# Patient Record
Sex: MALE | Race: WHITE | Employment: FULL TIME | URBAN - METROPOLITAN AREA
[De-identification: names, ages, dates, MRNs, and addresses within clinical notes are randomized per-mention and may not be internally consistent; named-entity substitution may affect disease eponyms.]

---

## 2017-01-06 ENCOUNTER — OFFICE VISIT (OUTPATIENT)
Dept: INTERNAL MEDICINE CLINIC | Facility: CLINIC | Age: 67
End: 2017-01-06

## 2017-01-06 VITALS
OXYGEN SATURATION: 98 % | HEIGHT: 72 IN | WEIGHT: 293.81 LBS | TEMPERATURE: 98 F | HEART RATE: 56 BPM | DIASTOLIC BLOOD PRESSURE: 80 MMHG | BODY MASS INDEX: 39.8 KG/M2 | SYSTOLIC BLOOD PRESSURE: 140 MMHG

## 2017-01-06 DIAGNOSIS — M15.9 PRIMARY OSTEOARTHRITIS INVOLVING MULTIPLE JOINTS: ICD-10-CM

## 2017-01-06 DIAGNOSIS — I67.1 CEREBRAL ANEURYSM: ICD-10-CM

## 2017-01-06 DIAGNOSIS — E78.00 HYPERCHOLESTEREMIA: ICD-10-CM

## 2017-01-06 DIAGNOSIS — K63.5 POLYP OF COLON, UNSPECIFIED PART OF COLON, UNSPECIFIED TYPE: ICD-10-CM

## 2017-01-06 DIAGNOSIS — I10 ESSENTIAL HYPERTENSION: Primary | ICD-10-CM

## 2017-01-06 PROBLEM — M15.0 PRIMARY OSTEOARTHRITIS INVOLVING MULTIPLE JOINTS: Status: ACTIVE | Noted: 2017-01-06

## 2017-01-06 PROCEDURE — 90670 PCV13 VACCINE IM: CPT | Performed by: INTERNAL MEDICINE

## 2017-01-06 PROCEDURE — 99214 OFFICE O/P EST MOD 30 MIN: CPT | Performed by: INTERNAL MEDICINE

## 2017-01-06 PROCEDURE — 90471 IMMUNIZATION ADMIN: CPT | Performed by: INTERNAL MEDICINE

## 2017-01-06 PROCEDURE — 99212 OFFICE O/P EST SF 10 MIN: CPT | Performed by: INTERNAL MEDICINE

## 2017-01-06 RX ORDER — MELOXICAM 15 MG/1
15 TABLET ORAL DAILY
Qty: 30 TABLET | Refills: 5 | Status: SHIPPED | OUTPATIENT
Start: 2017-01-06 | End: 2017-09-11

## 2017-01-06 NOTE — PATIENT INSTRUCTIONS
1.  Patient is to continue his current diet, medications and activity. 2.  Patient will be given his Prevnar vaccine today. He will need to have his Pneumovax next year.   3.  I will refer the patient to see Dr. Elisa Art, podiatrist, for evaluation of

## 2017-01-06 NOTE — PROGRESS NOTES
Annie Kay is a 73year old male. Patient presents with: Follow - Up: Patient reports he had a fall about the week after thanksgiving while putting up [employer] lights.  He denies hitting his head but reports his back and right hand continue to have Packs/Day: 0.00  Years:           Quit date: 06/14/2008    Smokeless Status: Former User                       Comment: When he was 12 yo    Alcohol Use: Yes                Comment: about one 12 pack beer/week       REVIEW OF SYSTEMS:   GENERAL Haim while sitting up [employer] lights on his porch. His hand is slowly improving. Patient has a prior injury with deformity involving the #1 MTP joint of his left foot. I will refer the patient to see Dr. Rogene Runner, a podiatrist in [place].

## 2017-01-13 ENCOUNTER — HOSPITAL ENCOUNTER (OUTPATIENT)
Dept: GENERAL RADIOLOGY | Facility: HOSPITAL | Age: 67
Discharge: HOME | End: 2017-01-13
Attending: PODIATRIST
Payer: COMMERCIAL

## 2017-01-13 ENCOUNTER — OFFICE VISIT (OUTPATIENT)
Dept: PODIATRY CLINIC | Facility: CLINIC | Age: 67
End: 2017-01-13

## 2017-01-13 DIAGNOSIS — M20.12 HALLUX VALGUS, LEFT: ICD-10-CM

## 2017-01-13 DIAGNOSIS — M79.672 LEFT FOOT PAIN: Primary | ICD-10-CM

## 2017-01-13 DIAGNOSIS — M20.42 HAMMER TOE OF LEFT FOOT: ICD-10-CM

## 2017-01-13 DIAGNOSIS — M79.672 LEFT FOOT PAIN: ICD-10-CM

## 2017-01-13 PROCEDURE — 99203 OFFICE O/P NEW LOW 30 MIN: CPT | Performed by: PODIATRIST

## 2017-01-13 PROCEDURE — 99212 OFFICE O/P EST SF 10 MIN: CPT | Performed by: PODIATRIST

## 2017-01-13 PROCEDURE — 73630 X-RAY EXAM OF FOOT: CPT

## 2017-01-13 NOTE — PROGRESS NOTES
HPI:    Patient ID: aSra Ray is a 73year old male. HPI  This 43-year-old male presents as a new patient to me and states that he is referred by Chio Macias. Patient's chief complaint is pain associated with his left foot.   He states that it hallux abductus angles. Passive motion is good without significant restriction. The second toe is semirigid in its position with contracture and slight overlap on the great toe.   There are presently no areas of plantar callus but minor discomfort is note

## 2017-04-01 ENCOUNTER — APPOINTMENT (OUTPATIENT)
Dept: LAB | Age: 67
End: 2017-04-01
Attending: INTERNAL MEDICINE
Payer: COMMERCIAL

## 2017-04-01 DIAGNOSIS — E78.00 HYPERCHOLESTEREMIA: ICD-10-CM

## 2017-04-01 PROCEDURE — 84450 TRANSFERASE (AST) (SGOT): CPT

## 2017-04-01 PROCEDURE — 36415 COLL VENOUS BLD VENIPUNCTURE: CPT

## 2017-04-01 PROCEDURE — 84460 ALANINE AMINO (ALT) (SGPT): CPT

## 2017-04-01 PROCEDURE — 80061 LIPID PANEL: CPT

## 2017-04-04 ENCOUNTER — OFFICE VISIT (OUTPATIENT)
Dept: PODIATRY CLINIC | Facility: CLINIC | Age: 67
End: 2017-04-04

## 2017-04-04 DIAGNOSIS — M20.42 HAMMER TOE OF LEFT FOOT: ICD-10-CM

## 2017-04-04 DIAGNOSIS — M20.12 HALLUX VALGUS, LEFT: ICD-10-CM

## 2017-04-04 DIAGNOSIS — M79.672 LEFT FOOT PAIN: Primary | ICD-10-CM

## 2017-04-04 PROCEDURE — 99212 OFFICE O/P EST SF 10 MIN: CPT | Performed by: PODIATRIST

## 2017-04-04 PROCEDURE — 99213 OFFICE O/P EST LOW 20 MIN: CPT | Performed by: PODIATRIST

## 2017-04-04 NOTE — PROGRESS NOTES
HPI:    Patient ID: Maryanne Lau is a 73year old male. HPI  This 50-year-old male presents for discussion of surgical considerations in reference to his left foot. Saw this patient about 3 months ago with the same complaint.   He states that not g

## 2017-04-11 ENCOUNTER — TELEPHONE (OUTPATIENT)
Dept: PODIATRY CLINIC | Facility: CLINIC | Age: 67
End: 2017-04-11

## 2017-04-11 NOTE — TELEPHONE ENCOUNTER
Spoke to pt and scheduled surgery with SCR at [facility] for 05/17/17 for his left foot.    Pt informed that [facility] will be calling 1-2 days before surgery to discuss the following:  - time of surgery and when to be there  - medications and allergies  - medical hx

## 2017-04-11 NOTE — TELEPHONE ENCOUNTER
pt called. He cannot do surgery 5/17/17. He is on call at work and cannot get out of it. He can do the date in June.   Please call

## 2017-04-11 NOTE — TELEPHONE ENCOUNTER
Pt is a surgical assisstant. Very anxious about finding his  Own  surgery time so he can put in for his work hours. . Home this afternoon and will be home after 9 am tomorrow. Please  Call back. for arranging his surgery time.

## 2017-04-12 ENCOUNTER — OFFICE VISIT (OUTPATIENT)
Dept: INTERNAL MEDICINE CLINIC | Facility: CLINIC | Age: 67
End: 2017-04-12

## 2017-04-12 ENCOUNTER — TELEPHONE (OUTPATIENT)
Dept: PODIATRY CLINIC | Facility: CLINIC | Age: 67
End: 2017-04-12

## 2017-04-12 VITALS
OXYGEN SATURATION: 97 % | WEIGHT: 292 LBS | TEMPERATURE: 98 F | DIASTOLIC BLOOD PRESSURE: 80 MMHG | BODY MASS INDEX: 38.7 KG/M2 | HEIGHT: 73 IN | HEART RATE: 52 BPM | SYSTOLIC BLOOD PRESSURE: 150 MMHG

## 2017-04-12 DIAGNOSIS — R53.83 FATIGUE, UNSPECIFIED TYPE: ICD-10-CM

## 2017-04-12 DIAGNOSIS — Z12.5 PROSTATE CANCER SCREENING: ICD-10-CM

## 2017-04-12 DIAGNOSIS — I10 ESSENTIAL HYPERTENSION: ICD-10-CM

## 2017-04-12 DIAGNOSIS — Z00.00 ANNUAL PHYSICAL EXAM: Primary | ICD-10-CM

## 2017-04-12 DIAGNOSIS — E78.00 HYPERCHOLESTEREMIA: ICD-10-CM

## 2017-04-12 DIAGNOSIS — K63.5 POLYP OF COLON, UNSPECIFIED PART OF COLON, UNSPECIFIED TYPE: ICD-10-CM

## 2017-04-12 DIAGNOSIS — I67.1 CEREBRAL ANEURYSM: ICD-10-CM

## 2017-04-12 DIAGNOSIS — M15.9 PRIMARY OSTEOARTHRITIS INVOLVING MULTIPLE JOINTS: ICD-10-CM

## 2017-04-12 PROCEDURE — 99214 OFFICE O/P EST MOD 30 MIN: CPT | Performed by: INTERNAL MEDICINE

## 2017-04-12 PROCEDURE — 90715 TDAP VACCINE 7 YRS/> IM: CPT | Performed by: INTERNAL MEDICINE

## 2017-04-12 PROCEDURE — 99212 OFFICE O/P EST SF 10 MIN: CPT | Performed by: INTERNAL MEDICINE

## 2017-04-12 PROCEDURE — 90471 IMMUNIZATION ADMIN: CPT | Performed by: INTERNAL MEDICINE

## 2017-04-12 NOTE — PATIENT INSTRUCTIONS
1.  Patient is to continue his current diet, medications and activity. 2.  Patient was given a Tdap vaccine today. 3.  I will plan to see the patient back in 4 months with blood tests, urinalysis and EKG.   His blood tests will include a CBC, CMP, lipid p

## 2017-04-12 NOTE — TELEPHONE ENCOUNTER
Patient calling again to reschedule surgery. States he can not get off work on 05/17 and would like to reschedule surgery to 06/07.  Please call back thank you

## 2017-04-12 NOTE — TELEPHONE ENCOUNTER
Spoke to [place] in [address] and she states that Tu Donovan from [address] is working on this form. Karen to tell Tu Donovan to call pt to discuss. Pt may call Tu Donovan at [phone], per Fam Bianchi.

## 2017-04-12 NOTE — PROGRESS NOTES
Beryle Garret is a 73year old male. Patient presents with:  Checkup: 3 mo f/u  Hypertension  Hyperlipidemia  Arthritis    HPI:   Patient presents with:  Checkup: 3 mo f/u  Hypertension  Hyperlipidemia  Arthritis    Pt feels well.   Pt is going to have c complaints of pain or swelling in patient's legs    EXAM:   /80 mmHg  Pulse 52  Temp(Src) 98.3 °F (36.8 °C)  Ht 6' 1\" (1.854 m)  Wt 292 lb (132.45 kg)  BMI 38.53 kg/m2  SpO2 97%  GENERAL: well developed, well nourished in no acute distress  HEENT: n

## 2017-04-13 NOTE — TELEPHONE ENCOUNTER
Spoke to pt and rescheduled surgery for 06/07/17. Pt informed [facility] will be calling 1-2 days before to give time of surgery. Pt verbalized understanding. Pt states he did speak with RAQUEL about form.

## 2017-05-09 NOTE — TELEPHONE ENCOUNTER
Called Denise and spoke to rep named Marcelo regarding PA for outpatient surgery with SCR at [facility]. No PA required. [facility] in network. Reference # [id number].

## 2017-05-09 NOTE — TELEPHONE ENCOUNTER
JOSEFINA with pt. Informed that I need his Medicare info (ID #, Group #, and phone #) so that I can send completed form to Chris Cornejo.. -- When pt calls, please get this info from pt and send info to me. Thank you!

## 2017-05-12 NOTE — TELEPHONE ENCOUNTER
Received Medicare info from pt at ortho  today. \  Faxed surgical scheduling form to [facility].

## 2017-06-07 ENCOUNTER — TELEPHONE (OUTPATIENT)
Dept: PODIATRY CLINIC | Facility: CLINIC | Age: 67
End: 2017-06-07

## 2017-06-07 NOTE — TELEPHONE ENCOUNTER
Called pt LMTCB- if pt CB please advise him he should make PO appt 1 wk from sx which would be on 6/14. Ok to DB.

## 2017-06-14 ENCOUNTER — OFFICE VISIT (OUTPATIENT)
Dept: PODIATRY CLINIC | Facility: CLINIC | Age: 67
End: 2017-06-14

## 2017-06-14 DIAGNOSIS — M20.42 HAMMER TOE OF LEFT FOOT: ICD-10-CM

## 2017-06-14 DIAGNOSIS — M20.12 HALLUX VALGUS, LEFT: ICD-10-CM

## 2017-06-14 DIAGNOSIS — M79.672 LEFT FOOT PAIN: Primary | ICD-10-CM

## 2017-06-14 PROCEDURE — 99024 POSTOP FOLLOW-UP VISIT: CPT | Performed by: PODIATRIST

## 2017-06-14 PROCEDURE — 99212 OFFICE O/P EST SF 10 MIN: CPT | Performed by: PODIATRIST

## 2017-06-14 RX ORDER — HYDROCODONE BITARTRATE AND ACETAMINOPHEN 5; 300 MG/1; MG/1
TABLET ORAL
COMMUNITY
Start: 2017-06-07 | End: 2017-07-19

## 2017-06-14 NOTE — PROGRESS NOTES
HPI:    Patient ID: Kendra Randle is a 73year old male. HPI  This pleasant 29-year-old male presents 1 week post left forefoot surgery. He has no specific complaints and is not presently taking pain medications.   Review of Systems         Current

## 2017-06-21 ENCOUNTER — OFFICE VISIT (OUTPATIENT)
Dept: PODIATRY CLINIC | Facility: CLINIC | Age: 67
End: 2017-06-21

## 2017-06-21 ENCOUNTER — HOSPITAL ENCOUNTER (OUTPATIENT)
Dept: GENERAL RADIOLOGY | Facility: HOSPITAL | Age: 67
Discharge: HOME | End: 2017-06-21
Attending: PODIATRIST
Payer: COMMERCIAL

## 2017-06-21 DIAGNOSIS — M20.42 HAMMER TOE OF LEFT FOOT: ICD-10-CM

## 2017-06-21 DIAGNOSIS — Z47.89 ORTHOPEDIC AFTERCARE: Primary | ICD-10-CM

## 2017-06-21 DIAGNOSIS — M20.12 HALLUX VALGUS, LEFT: ICD-10-CM

## 2017-06-21 DIAGNOSIS — Z47.89 ORTHOPEDIC AFTERCARE: ICD-10-CM

## 2017-06-21 PROCEDURE — 73630 X-RAY EXAM OF FOOT: CPT | Performed by: PODIATRIST

## 2017-06-21 PROCEDURE — 99024 POSTOP FOLLOW-UP VISIT: CPT | Performed by: PODIATRIST

## 2017-06-21 PROCEDURE — 99212 OFFICE O/P EST SF 10 MIN: CPT | Performed by: PODIATRIST

## 2017-06-21 NOTE — PROGRESS NOTES
HPI:    Patient ID: Christian Lowery is a 73year old male. HPI  This 21-year-old male presents postsurgical at 2 weeks for bunion and hammertoe left. No specific noted complaints are apparent.   Review of Systems         Current Outpatient Prescriptio

## 2017-07-10 ENCOUNTER — APPOINTMENT (OUTPATIENT)
Dept: OTHER | Facility: HOSPITAL | Age: 67
End: 2017-07-10
Attending: PREVENTIVE MEDICINE

## 2017-07-13 ENCOUNTER — APPOINTMENT (OUTPATIENT)
Dept: OTHER | Facility: HOSPITAL | Age: 67
End: 2017-07-13
Attending: PREVENTIVE MEDICINE

## 2017-07-19 ENCOUNTER — OFFICE VISIT (OUTPATIENT)
Dept: PODIATRY CLINIC | Facility: CLINIC | Age: 67
End: 2017-07-19

## 2017-07-19 DIAGNOSIS — M20.12 HALLUX VALGUS, LEFT: Primary | ICD-10-CM

## 2017-07-19 DIAGNOSIS — M20.42 HAMMER TOE OF LEFT FOOT: ICD-10-CM

## 2017-07-19 PROCEDURE — 99024 POSTOP FOLLOW-UP VISIT: CPT | Performed by: PODIATRIST

## 2017-07-19 PROCEDURE — 99212 OFFICE O/P EST SF 10 MIN: CPT | Performed by: PODIATRIST

## 2017-07-19 NOTE — PROGRESS NOTES
HPI:    Patient ID: Sahil Ellis is a 78year old male. HPI  46-year-old male presents about 5 weeks post left forefoot surgery. No specific complaints are noted. Today he tried to put on a shoe to return to work and was unable to do so.   Review

## 2017-08-04 ENCOUNTER — OFFICE VISIT (OUTPATIENT)
Dept: PODIATRY CLINIC | Facility: CLINIC | Age: 67
End: 2017-08-04

## 2017-08-04 DIAGNOSIS — M20.12 HALLUX VALGUS, LEFT: Primary | ICD-10-CM

## 2017-08-04 DIAGNOSIS — M20.42 HAMMER TOE OF LEFT FOOT: ICD-10-CM

## 2017-08-04 PROCEDURE — 99024 POSTOP FOLLOW-UP VISIT: CPT | Performed by: PODIATRIST

## 2017-08-04 PROCEDURE — G0463 HOSPITAL OUTPT CLINIC VISIT: HCPCS | Performed by: PODIATRIST

## 2017-08-04 NOTE — PROGRESS NOTES
HPI:    Patient ID: Germán Nowak is a 78year old male. HPI  This 60-year-old male presents for follow-up in about 2 months postsurgical for left foot surgery. Patient has no pain swelling does continue to be noted.   Review of Systems         Curr

## 2017-08-09 RX ORDER — ATORVASTATIN CALCIUM 20 MG/1
TABLET, FILM COATED ORAL
Qty: 30 TABLET | Refills: 11 | Status: SHIPPED | OUTPATIENT
Start: 2017-08-09 | End: 2018-08-26

## 2017-08-09 RX ORDER — LISINOPRIL 10 MG/1
TABLET ORAL
Qty: 30 TABLET | Refills: 11 | Status: SHIPPED | OUTPATIENT
Start: 2017-08-09 | End: 2018-07-20 | Stop reason: DRUGHIGH

## 2017-08-09 NOTE — TELEPHONE ENCOUNTER
Refill request is for a maintenance medication and has met the criteria specified in the Ambulatory Medication Refill Standing Order for eligibility, visits, laboratory, alerts and was sent to the requested pharmacy.     To FD pls call pt to schedule follow

## 2017-09-06 ENCOUNTER — OFFICE VISIT (OUTPATIENT)
Dept: PODIATRY CLINIC | Facility: CLINIC | Age: 67
End: 2017-09-06

## 2017-09-06 DIAGNOSIS — M20.42 HAMMER TOE OF LEFT FOOT: ICD-10-CM

## 2017-09-06 DIAGNOSIS — M20.12 HALLUX VALGUS, LEFT: Primary | ICD-10-CM

## 2017-09-06 PROCEDURE — 99213 OFFICE O/P EST LOW 20 MIN: CPT | Performed by: PODIATRIST

## 2017-09-06 PROCEDURE — G0463 HOSPITAL OUTPT CLINIC VISIT: HCPCS | Performed by: PODIATRIST

## 2017-09-06 NOTE — PROGRESS NOTES
HPI:    Patient ID: Christiana Seo is a 78year old male. HPI  This 52-year-old male presents 3 months post left bunionectomy. Patient reports no pain, working steadily without restriction but does have some swelling.   Review of Systems         Curr

## 2017-09-14 RX ORDER — MELOXICAM 15 MG/1
15 TABLET ORAL DAILY
Qty: 30 TABLET | Refills: 11 | Status: SHIPPED | OUTPATIENT
Start: 2017-09-14 | End: 2018-09-27

## 2017-09-14 RX ORDER — MELOXICAM 15 MG/1
15 TABLET ORAL DAILY
Qty: 30 TABLET | Refills: 4 | OUTPATIENT
Start: 2017-09-14

## 2017-12-02 ENCOUNTER — APPOINTMENT (OUTPATIENT)
Dept: LAB | Age: 67
End: 2017-12-02
Attending: INTERNAL MEDICINE
Payer: COMMERCIAL

## 2017-12-02 DIAGNOSIS — Z12.5 PROSTATE CANCER SCREENING: ICD-10-CM

## 2017-12-02 DIAGNOSIS — Z00.00 ANNUAL PHYSICAL EXAM: ICD-10-CM

## 2017-12-02 PROCEDURE — 85025 COMPLETE CBC W/AUTO DIFF WBC: CPT | Performed by: INTERNAL MEDICINE

## 2017-12-02 PROCEDURE — 80053 COMPREHEN METABOLIC PANEL: CPT | Performed by: INTERNAL MEDICINE

## 2017-12-02 PROCEDURE — 80061 LIPID PANEL: CPT | Performed by: INTERNAL MEDICINE

## 2017-12-02 PROCEDURE — 36415 COLL VENOUS BLD VENIPUNCTURE: CPT | Performed by: INTERNAL MEDICINE

## 2017-12-02 PROCEDURE — 84443 ASSAY THYROID STIM HORMONE: CPT | Performed by: INTERNAL MEDICINE

## 2017-12-02 PROCEDURE — 81001 URINALYSIS AUTO W/SCOPE: CPT | Performed by: INTERNAL MEDICINE

## 2017-12-08 ENCOUNTER — OFFICE VISIT (OUTPATIENT)
Dept: INTERNAL MEDICINE CLINIC | Facility: CLINIC | Age: 67
End: 2017-12-08

## 2017-12-08 VITALS
RESPIRATION RATE: 18 BRPM | HEIGHT: 72 IN | SYSTOLIC BLOOD PRESSURE: 144 MMHG | HEART RATE: 48 BPM | DIASTOLIC BLOOD PRESSURE: 86 MMHG | BODY MASS INDEX: 39.14 KG/M2 | WEIGHT: 289 LBS | TEMPERATURE: 98 F | OXYGEN SATURATION: 96 %

## 2017-12-08 DIAGNOSIS — R97.20 ELEVATED PSA: ICD-10-CM

## 2017-12-08 DIAGNOSIS — I67.1 CEREBRAL ANEURYSM: ICD-10-CM

## 2017-12-08 DIAGNOSIS — E78.00 HYPERCHOLESTEREMIA: ICD-10-CM

## 2017-12-08 DIAGNOSIS — M15.9 PRIMARY OSTEOARTHRITIS INVOLVING MULTIPLE JOINTS: ICD-10-CM

## 2017-12-08 DIAGNOSIS — K63.5 POLYP OF COLON, UNSPECIFIED PART OF COLON, UNSPECIFIED TYPE: ICD-10-CM

## 2017-12-08 DIAGNOSIS — Z00.00 ANNUAL PHYSICAL EXAM: Primary | ICD-10-CM

## 2017-12-08 DIAGNOSIS — R53.83 FATIGUE, UNSPECIFIED TYPE: ICD-10-CM

## 2017-12-08 DIAGNOSIS — I10 ESSENTIAL HYPERTENSION: ICD-10-CM

## 2017-12-08 PROCEDURE — 99214 OFFICE O/P EST MOD 30 MIN: CPT | Performed by: INTERNAL MEDICINE

## 2017-12-08 PROCEDURE — 99397 PER PM REEVAL EST PAT 65+ YR: CPT | Performed by: INTERNAL MEDICINE

## 2017-12-08 PROCEDURE — 93010 ELECTROCARDIOGRAM REPORT: CPT | Performed by: INTERNAL MEDICINE

## 2017-12-08 PROCEDURE — 99212 OFFICE O/P EST SF 10 MIN: CPT | Performed by: INTERNAL MEDICINE

## 2017-12-08 RX ORDER — METHOCARBAMOL 500 MG/1
500 TABLET, FILM COATED ORAL 4 TIMES DAILY PRN
Qty: 30 TABLET | Refills: 3 | Status: SHIPPED | OUTPATIENT
Start: 2017-12-08 | End: 2017-12-18

## 2017-12-08 NOTE — PATIENT INSTRUCTIONS
1.  Patient is to continue his current diet, medication and activity. 2.  Patient is to watch his diet more closely regarding his carbs and sweets. He is also to watch his alcohol intake. 3.  Patient is to attempt to do some walking on a regular basis.

## 2017-12-09 NOTE — PROGRESS NOTES
Elenita Flowers is a 78year old male who presents for a complete physical exam.   HPI:    Mr. Matilde Alcantar is a 41-year-old white male who was seen by me on December 8, 2017 for his annual physical examination.   At the time the examination Mr. Creta Koyanagi TOTAL) BY MOUTH NIGHTLY.  Disp: 30 tablet Rfl: 11   SB LOW DOSE ASA EC 81 MG OR TBEC 1 TABLET DAILY Disp:  Rfl:       Past Medical History:   Diagnosis Date   • Aneurysm of unspecified site [facility])    • Cataracts, both eyes    • Hip pain 1/28/2010   • HYPERLI kg)   SpO2 96%   BMI 39.20 kg/m²   GENERAL: well developed, well nourished,in no acute distress  SKIN: no rashes,no suspicious lesions  HEENT: atraumatic, normocephalic, normal oropharynx, ears appear normal, normal TM's  EYES:PERRLA, EOMI, conjunctivae pi Robaxin to his pharmacy for 500 mg that he can take 3 or 4 times a day as necessary for back pain. I have given 30 tablets with 3 refills. He can use as necessary.   I will plan to see the patient back in 6 months with blood tests which will include a BMP or Pneumococcal?: Yes     Functional Ability     Bathing or Showering: Able without help    Toileting: Able without help    Dressing: Able without help    Eating: Able without help    Driving: Able without help    Preparing your meals: Able without help hearing conversations in a noisy background such as a crowded room or restaurant:  No   I get confused about where sounds come from:  No I misunderstand some words in a sentence and need to ask people to repeat themselves:  No   I especially have trouble u Date Value   06/30/2012 64     Calculated LDL (mg/dL)   Date Value   03/29/2014 79.0     LDL Cholesterol (mg/dL)   Date Value   12/02/2017 79   09/17/2016 91        EKG One Time Pt had his EKG done today.     Colorectal Cancer Screening      Colonoscopy S Date Value   12/02/2017 4.4   03/29/2014 4.2   06/30/2012 4.7     POTASSIUM (P) (mmol/L)   Date Value   09/17/2016 4.3    No flowsheet data found.     Creatinine  Annually Creatinine, Serum (mg/dL)   Date Value   06/30/2012 0.96     Creatinine (mg/dL)   D

## 2018-06-25 ENCOUNTER — APPOINTMENT (OUTPATIENT)
Dept: OTHER | Facility: HOSPITAL | Age: 68
End: 2018-06-25
Attending: PREVENTIVE MEDICINE

## 2018-06-27 ENCOUNTER — APPOINTMENT (OUTPATIENT)
Dept: OTHER | Facility: HOSPITAL | Age: 68
End: 2018-06-27
Attending: FAMILY MEDICINE

## 2018-07-12 ENCOUNTER — APPOINTMENT (OUTPATIENT)
Dept: LAB | Age: 68
End: 2018-07-12
Attending: INTERNAL MEDICINE
Payer: MEDICARE

## 2018-07-12 DIAGNOSIS — R97.20 ELEVATED PSA: ICD-10-CM

## 2018-07-12 DIAGNOSIS — E78.00 HYPERCHOLESTEREMIA: ICD-10-CM

## 2018-07-12 LAB
ALT SERPL-CCNC: 32 U/L (ref 17–63)
ANION GAP SERPL CALC-SCNC: 6 MMOL/L (ref 0–18)
AST SERPL-CCNC: 28 U/L (ref 15–41)
BUN SERPL-MCNC: 21 MG/DL (ref 8–20)
BUN/CREAT SERPL: 24.1 (ref 10–20)
CALCIUM SERPL-MCNC: 9.4 MG/DL (ref 8.5–10.5)
CHLORIDE SERPL-SCNC: 110 MMOL/L (ref 95–110)
CHOLEST SERPL-MCNC: 157 MG/DL (ref 110–200)
CO2 SERPL-SCNC: 25 MMOL/L (ref 22–32)
CREAT SERPL-MCNC: 0.87 MG/DL (ref 0.5–1.5)
GLUCOSE SERPL-MCNC: 104 MG/DL (ref 70–99)
HDLC SERPL-MCNC: 46 MG/DL
LDLC SERPL CALC-MCNC: 84 MG/DL (ref 0–99)
NONHDLC SERPL-MCNC: 111 MG/DL
OSMOLALITY UR CALC.SUM OF ELEC: 295 MOSM/KG (ref 275–295)
POTASSIUM SERPL-SCNC: 4.1 MMOL/L (ref 3.3–5.1)
PSA SERPL-MCNC: 4.1 NG/ML (ref 0–4)
SODIUM SERPL-SCNC: 141 MMOL/L (ref 136–144)
TRIGL SERPL-MCNC: 133 MG/DL (ref 1–149)

## 2018-07-12 PROCEDURE — 84153 ASSAY OF PSA TOTAL: CPT

## 2018-07-12 PROCEDURE — 80061 LIPID PANEL: CPT

## 2018-07-12 PROCEDURE — 84450 TRANSFERASE (AST) (SGOT): CPT

## 2018-07-12 PROCEDURE — 80048 BASIC METABOLIC PNL TOTAL CA: CPT | Performed by: INTERNAL MEDICINE

## 2018-07-12 PROCEDURE — 84460 ALANINE AMINO (ALT) (SGPT): CPT

## 2018-07-12 PROCEDURE — 36415 COLL VENOUS BLD VENIPUNCTURE: CPT

## 2018-07-20 ENCOUNTER — OFFICE VISIT (OUTPATIENT)
Dept: INTERNAL MEDICINE CLINIC | Facility: CLINIC | Age: 68
End: 2018-07-20
Payer: MEDICARE

## 2018-07-20 VITALS
HEIGHT: 73 IN | BODY MASS INDEX: 39.1 KG/M2 | HEART RATE: 60 BPM | WEIGHT: 295 LBS | TEMPERATURE: 98 F | SYSTOLIC BLOOD PRESSURE: 150 MMHG | DIASTOLIC BLOOD PRESSURE: 74 MMHG | OXYGEN SATURATION: 97 %

## 2018-07-20 DIAGNOSIS — Z00.00 ANNUAL PHYSICAL EXAM: ICD-10-CM

## 2018-07-20 DIAGNOSIS — R97.20 ELEVATED PSA: ICD-10-CM

## 2018-07-20 DIAGNOSIS — I10 ESSENTIAL HYPERTENSION: Primary | ICD-10-CM

## 2018-07-20 DIAGNOSIS — E78.00 HYPERCHOLESTEREMIA: ICD-10-CM

## 2018-07-20 DIAGNOSIS — R53.83 FATIGUE, UNSPECIFIED TYPE: ICD-10-CM

## 2018-07-20 DIAGNOSIS — M54.9 BACK PAIN, UNSPECIFIED BACK LOCATION, UNSPECIFIED BACK PAIN LATERALITY, UNSPECIFIED CHRONICITY: ICD-10-CM

## 2018-07-20 DIAGNOSIS — M15.9 PRIMARY OSTEOARTHRITIS INVOLVING MULTIPLE JOINTS: ICD-10-CM

## 2018-07-20 DIAGNOSIS — I67.1 CEREBRAL ANEURYSM: ICD-10-CM

## 2018-07-20 PROCEDURE — G0463 HOSPITAL OUTPT CLINIC VISIT: HCPCS | Performed by: INTERNAL MEDICINE

## 2018-07-20 PROCEDURE — 99214 OFFICE O/P EST MOD 30 MIN: CPT | Performed by: INTERNAL MEDICINE

## 2018-07-20 RX ORDER — METHOCARBAMOL 750 MG/1
750 TABLET, FILM COATED ORAL 3 TIMES DAILY
COMMUNITY
End: 2018-07-20 | Stop reason: DRUGHIGH

## 2018-07-20 RX ORDER — METHOCARBAMOL 500 MG/1
TABLET, FILM COATED ORAL
Qty: 30 TABLET | Refills: 3 | Status: SHIPPED | OUTPATIENT
Start: 2018-07-20 | End: 2019-01-07

## 2018-07-20 RX ORDER — LISINOPRIL 20 MG/1
20 TABLET ORAL DAILY
Qty: 90 TABLET | Refills: 3 | Status: SHIPPED | OUTPATIENT
Start: 2018-07-20 | End: 2019-09-09

## 2018-07-20 RX ORDER — METHOCARBAMOL 500 MG/1
500 TABLET, FILM COATED ORAL 4 TIMES DAILY
COMMUNITY
End: 2018-07-20

## 2018-07-20 NOTE — PROGRESS NOTES
Yandy Hutchins is a 80year old male. Patient presents with: Follow - Up: Here today for 6 month follow up  Hypertension  Hyperlipidemia  Arthritis    HPI:   Patient presents with:   Follow - Up: Here today for 6 month follow up  Hypertension  Hyperlipid vomiting, diarrhea, or constipation  :No Urinary complaints  EXT:No complaints of pain or swelling in patient's legs    EXAM:   /74 (BP Location: Right arm, Patient Position: Sitting, Cuff Size: large)   Pulse 60   Temp 98.1 °F (36.7 °C) (Oral)   H the prostate again. If the PSA becomes more elevated than the patient may well need a urology referral.    6. Back pain, unspecified back location, unspecified back pain laterality, unspecified chronicity  Stable.   CPM.  Patient's Robaxin was renewed toda

## 2018-08-16 ENCOUNTER — TELEPHONE (OUTPATIENT)
Dept: INTERNAL MEDICINE CLINIC | Facility: CLINIC | Age: 68
End: 2018-08-16

## 2018-08-23 NOTE — TELEPHONE ENCOUNTER
PA completed and faxed back to [facility] @ [phone], conformation received. Original placed in red folder.

## 2018-08-27 RX ORDER — ATORVASTATIN CALCIUM 20 MG/1
TABLET, FILM COATED ORAL
Qty: 90 TABLET | Refills: 3 | Status: SHIPPED | OUTPATIENT
Start: 2018-08-27 | End: 2019-10-06

## 2018-09-27 RX ORDER — MELOXICAM 15 MG/1
TABLET ORAL
Qty: 30 TABLET | Refills: 11 | Status: ON HOLD | OUTPATIENT
Start: 2018-09-27 | End: 2019-05-04

## 2018-12-06 NOTE — TELEPHONE ENCOUNTER
Pt had surgery today - need f/u appt for Monday - no openings Telephone Encounter by Bekah Iglesias RN at 10/06/17 05:16 PM     Author:  Bekah Iglesias RN Service:  (none) Author Type:  Registered Nurse     Filed:  10/06/17 05:16 PM Encounter Date:  10/4/2017 Status:  Signed     :  Bekah Iglesias RN (Registered Nurse)            Addressed in other TE[RR1.1M]      Revision History        User Key Date/Time User Provider Type Action    > RR1.1 10/06/17 05:16 PM Bekah Iglesias RN Registered Nurse Sign    M - Manual

## 2018-12-29 ENCOUNTER — LAB ENCOUNTER (OUTPATIENT)
Dept: LAB | Age: 68
End: 2018-12-29
Attending: INTERNAL MEDICINE
Payer: MEDICARE

## 2018-12-29 DIAGNOSIS — Z00.00 ANNUAL PHYSICAL EXAM: ICD-10-CM

## 2018-12-29 DIAGNOSIS — E78.00 HYPERCHOLESTEREMIA: ICD-10-CM

## 2018-12-29 DIAGNOSIS — R97.20 ELEVATED PSA: ICD-10-CM

## 2018-12-29 DIAGNOSIS — R53.83 FATIGUE, UNSPECIFIED TYPE: ICD-10-CM

## 2018-12-29 PROCEDURE — 36415 COLL VENOUS BLD VENIPUNCTURE: CPT

## 2018-12-29 PROCEDURE — 84153 ASSAY OF PSA TOTAL: CPT

## 2018-12-29 PROCEDURE — 81001 URINALYSIS AUTO W/SCOPE: CPT

## 2018-12-29 PROCEDURE — 84443 ASSAY THYROID STIM HORMONE: CPT

## 2018-12-29 PROCEDURE — 80053 COMPREHEN METABOLIC PANEL: CPT

## 2018-12-29 PROCEDURE — 85025 COMPLETE CBC W/AUTO DIFF WBC: CPT

## 2018-12-29 PROCEDURE — 80061 LIPID PANEL: CPT

## 2018-12-31 ENCOUNTER — TELEPHONE (OUTPATIENT)
Dept: INTERNAL MEDICINE CLINIC | Facility: CLINIC | Age: 68
End: 2018-12-31

## 2018-12-31 NOTE — TELEPHONE ENCOUNTER
LMTCB to see if patient is having urinary symptoms.  Recent UA abnormal. To Dr. Grayce Eisenmenger to review, ok to wait for Dr. Christine Manning?

## 2018-12-31 NOTE — TELEPHONE ENCOUNTER
Telephone call to patient and message left. Patient's recent urinalysis is turned out well. I will see the patient next week and will discuss results of his lab tests and urinalysis with him at that time.   Thank you!!

## 2019-01-07 ENCOUNTER — HOSPITAL ENCOUNTER (OUTPATIENT)
Dept: GENERAL RADIOLOGY | Age: 69
Discharge: HOME | End: 2019-01-07
Attending: INTERNAL MEDICINE | Admitting: INTERNAL MEDICINE
Payer: MEDICARE

## 2019-01-07 ENCOUNTER — TELEPHONE (OUTPATIENT)
Dept: INTERNAL MEDICINE CLINIC | Facility: CLINIC | Age: 69
End: 2019-01-07

## 2019-01-07 ENCOUNTER — OFFICE VISIT (OUTPATIENT)
Dept: INTERNAL MEDICINE CLINIC | Facility: CLINIC | Age: 69
End: 2019-01-07
Payer: MEDICARE

## 2019-01-07 VITALS
BODY MASS INDEX: 40.5 KG/M2 | HEIGHT: 72 IN | WEIGHT: 299 LBS | OXYGEN SATURATION: 97 % | HEART RATE: 56 BPM | TEMPERATURE: 98 F | DIASTOLIC BLOOD PRESSURE: 86 MMHG | SYSTOLIC BLOOD PRESSURE: 150 MMHG

## 2019-01-07 DIAGNOSIS — Z00.00 ANNUAL PHYSICAL EXAM: Primary | ICD-10-CM

## 2019-01-07 DIAGNOSIS — M54.9 BACK PAIN, UNSPECIFIED BACK LOCATION, UNSPECIFIED BACK PAIN LATERALITY, UNSPECIFIED CHRONICITY: ICD-10-CM

## 2019-01-07 DIAGNOSIS — M25.561 ACUTE PAIN OF RIGHT KNEE: ICD-10-CM

## 2019-01-07 DIAGNOSIS — J40 BRONCHITIS: ICD-10-CM

## 2019-01-07 DIAGNOSIS — E78.00 HYPERCHOLESTEREMIA: ICD-10-CM

## 2019-01-07 DIAGNOSIS — I10 ESSENTIAL HYPERTENSION: ICD-10-CM

## 2019-01-07 DIAGNOSIS — R97.20 ELEVATED PSA: ICD-10-CM

## 2019-01-07 DIAGNOSIS — I67.1 CEREBRAL ANEURYSM: ICD-10-CM

## 2019-01-07 DIAGNOSIS — M15.9 PRIMARY OSTEOARTHRITIS INVOLVING MULTIPLE JOINTS: ICD-10-CM

## 2019-01-07 DIAGNOSIS — R94.31 ABNORMAL EKG: ICD-10-CM

## 2019-01-07 LAB
OCCULT BLOOD, STOOL 1: NEGATIVE
PERFORMANCE MONITORS CORRECT (YES/NO): YES YES/NO

## 2019-01-07 PROCEDURE — G0463 HOSPITAL OUTPT CLINIC VISIT: HCPCS | Performed by: INTERNAL MEDICINE

## 2019-01-07 PROCEDURE — 73564 X-RAY EXAM KNEE 4 OR MORE: CPT | Performed by: INTERNAL MEDICINE

## 2019-01-07 PROCEDURE — 99214 OFFICE O/P EST MOD 30 MIN: CPT | Performed by: INTERNAL MEDICINE

## 2019-01-07 PROCEDURE — 93005 ELECTROCARDIOGRAM TRACING: CPT | Performed by: INTERNAL MEDICINE

## 2019-01-07 PROCEDURE — 93010 ELECTROCARDIOGRAM REPORT: CPT | Performed by: INTERNAL MEDICINE

## 2019-01-07 PROCEDURE — G0439 PPPS, SUBSEQ VISIT: HCPCS | Performed by: INTERNAL MEDICINE

## 2019-01-07 PROCEDURE — 82272 OCCULT BLD FECES 1-3 TESTS: CPT | Performed by: INTERNAL MEDICINE

## 2019-01-07 RX ORDER — TRAMADOL HYDROCHLORIDE 50 MG/1
50 TABLET ORAL EVERY 6 HOURS PRN
Qty: 40 TABLET | Refills: 3 | Status: ON HOLD | OUTPATIENT
Start: 2019-01-07 | End: 2019-05-04

## 2019-01-07 RX ORDER — METHOCARBAMOL 500 MG/1
TABLET, FILM COATED ORAL
Qty: 30 TABLET | Refills: 3 | Status: SHIPPED | OUTPATIENT
Start: 2019-01-07 | End: 2019-04-23

## 2019-01-07 RX ORDER — AZITHROMYCIN 250 MG/1
TABLET, FILM COATED ORAL
Qty: 6 TABLET | Refills: 1 | Status: SHIPPED | OUTPATIENT
Start: 2019-01-07 | End: 2019-04-23

## 2019-01-07 RX ORDER — AMLODIPINE BESYLATE 5 MG/1
5 TABLET ORAL DAILY
Qty: 90 TABLET | Refills: 3 | Status: SHIPPED | OUTPATIENT
Start: 2019-01-07 | End: 2019-12-13

## 2019-01-07 NOTE — TELEPHONE ENCOUNTER
Jacinto faxed a PA for: Tramadol  Id.  # [id number]  Ph. # [phone]  Placed in [address] folder  Routed to Rx

## 2019-01-07 NOTE — PATIENT INSTRUCTIONS
1.  Patient is to continue his current diet, medication and activity. 2.  I will place the patient on amlodipine/Norvasc 5 mg orally every morning to assist with blood pressure control. 3.  I will obtain an x-ray of patient's right knee.   4.  I will refe

## 2019-01-09 RX ORDER — METHOCARBAMOL 500 MG/1
TABLET, FILM COATED ORAL
Qty: 30 TABLET | Refills: 3 | OUTPATIENT
Start: 2019-01-09

## 2019-01-09 NOTE — TELEPHONE ENCOUNTER
Current refill request refused due to refill is either a duplicate request or has active refills at the pharmacy. Check previous templates.     Requested Prescriptions     Refused Prescriptions Disp Refills   • methocarbamol 500 MG Oral Tab 30 tablet 3

## 2019-01-15 ENCOUNTER — TELEPHONE (OUTPATIENT)
Dept: INTERNAL MEDICINE CLINIC | Facility: CLINIC | Age: 69
End: 2019-01-15

## 2019-01-16 NOTE — TELEPHONE ENCOUNTER
Telephone call to pt and message left,  His recent X-ray of his right knee shows pt to have moderate osteoarthritis. Pt is planning to see an Orthopedic Surgeon.   I have left a message that Dr Macy Gutierrez will be retiring at the end of March,  I have left th

## 2019-01-17 ENCOUNTER — HOSPITAL ENCOUNTER (OUTPATIENT)
Dept: GENERAL RADIOLOGY | Age: 69
Discharge: HOME | End: 2019-01-17
Attending: ORTHOPAEDIC SURGERY
Payer: MEDICARE

## 2019-01-17 DIAGNOSIS — M25.561 RIGHT KNEE PAIN: ICD-10-CM

## 2019-01-17 DIAGNOSIS — M25.551 RIGHT HIP PAIN: ICD-10-CM

## 2019-01-17 PROCEDURE — 73502 X-RAY EXAM HIP UNI 2-3 VIEWS: CPT | Performed by: ORTHOPAEDIC SURGERY

## 2019-04-20 ENCOUNTER — HOSPITAL ENCOUNTER (OUTPATIENT)
Dept: GENERAL RADIOLOGY | Age: 69
Discharge: HOME | End: 2019-04-20
Attending: ORTHOPAEDIC SURGERY
Payer: MEDICARE

## 2019-04-20 ENCOUNTER — LAB ENCOUNTER (OUTPATIENT)
Dept: LAB | Age: 69
End: 2019-04-20
Attending: INTERNAL MEDICINE
Payer: MEDICARE

## 2019-04-20 ENCOUNTER — APPOINTMENT (OUTPATIENT)
Dept: LAB | Age: 69
End: 2019-04-20
Attending: INTERNAL MEDICINE
Payer: MEDICARE

## 2019-04-20 DIAGNOSIS — M17.11 PRIMARY OSTEOARTHRITIS OF RIGHT KNEE: ICD-10-CM

## 2019-04-20 DIAGNOSIS — Z01.818 PREOP TESTING: ICD-10-CM

## 2019-04-20 DIAGNOSIS — E78.00 HYPERCHOLESTEREMIA: ICD-10-CM

## 2019-04-20 LAB
ABSOLUTE IMMATURE GRANULOCYTES (OFFPRE24): NORMAL
ALBUMIN SERPL-MCNC: 3.9 G/DL
ALBUMIN/GLOB SERPL: NORMAL {RATIO}
ALP SERPL-CCNC: 65 U/L
ALT SERPL-CCNC: 48 U/L
ANION GAP SERPL CALC-SCNC: 5 MMOL/L
AST SERPL-CCNC: 29 U/L
BASO+EOS+MONOS # BLD: NORMAL 10*3/UL
BASO+EOS+MONOS NFR BLD: NORMAL %
BASOPHILS # BLD: NORMAL 10*3/UL
BASOPHILS NFR BLD: NORMAL %
BILIRUB SERPL-MCNC: 0.6 MG/DL
BUN SERPL-MCNC: 21 MG/DL
BUN/CREAT SERPL: NORMAL
CALCIUM SERPL-MCNC: 9.3 MG/DL
CHLORIDE SERPL-SCNC: 113 MMOL/L
CHOLEST SERPL-MCNC: 146 MG/DL
CHOLEST/HDLC SERPL: 51 {RATIO}
CO2 SERPL-SCNC: 22 MMOL/L
CREAT SERPL-MCNC: 0.83 MG/DL
DIFFERENTIAL METHOD BLD: NORMAL
EOSINOPHIL # BLD: NORMAL 10*3/UL
EOSINOPHIL NFR BLD: NORMAL %
ERYTHROCYTE [DISTWIDTH] IN BLOOD: NORMAL %
GLOBULIN SER-MCNC: 3 G/DL
GLUCOSE SERPL-MCNC: 97 MG/DL
HCT VFR BLD CALC: 46 %
HDLC SERPL-MCNC: 51 MG/DL
HGB BLD-MCNC: 14.7 G/DL
IMMATURE GRANULOCYTES (OFFPRE25): NORMAL
LDLC SERPL CALC-MCNC: 81 MG/DL
LENGTH OF FAST TIME PATIENT: NORMAL H
LENGTH OF FAST TIME PATIENT: NORMAL H
LYMPHOCYTES # BLD: NORMAL 10*3/UL
LYMPHOCYTES NFR BLD: NORMAL %
MCH RBC QN AUTO: 29.1 PG
MCHC RBC AUTO-ENTMCNC: 32 G/DL
MCV RBC AUTO: 90.9 FL
MONOCYTES # BLD: NORMAL 10*3/UL
MONOCYTES NFR BLD: NORMAL %
MPV (OFFPRE2): NORMAL
NEUTROPHILS # BLD: NORMAL 10*3/UL
NEUTROPHILS NFR BLD: NORMAL %
NONHDLC SERPL-MCNC: 95 MG/DL
NRBC BLD MANUAL-RTO: NORMAL %
PLAT MORPH BLD: NORMAL
PLATELET # BLD: 178 10*3/UL
POTASSIUM SERPL-SCNC: 4.3 MMOL/L
PROT SERPL-MCNC: 6.9 G/DL
RBC # BLD: 5.06 10*6/UL
RBC MORPH BLD: NORMAL
SODIUM SERPL-SCNC: 140 MMOL/L
TRIGL SERPL-MCNC: 71 MG/DL
VLDLC SERPL CALC-MCNC: 14 MG/DL
WBC # BLD: 5.4 10*3/UL
WBC MORPH BLD: NORMAL

## 2019-04-20 PROCEDURE — 86850 RBC ANTIBODY SCREEN: CPT

## 2019-04-20 PROCEDURE — 85610 PROTHROMBIN TIME: CPT

## 2019-04-20 PROCEDURE — 93010 ELECTROCARDIOGRAM REPORT: CPT | Performed by: ORTHOPAEDIC SURGERY

## 2019-04-20 PROCEDURE — 80053 COMPREHEN METABOLIC PANEL: CPT

## 2019-04-20 PROCEDURE — 71046 X-RAY EXAM CHEST 2 VIEWS: CPT | Performed by: ORTHOPAEDIC SURGERY

## 2019-04-20 PROCEDURE — 86901 BLOOD TYPING SEROLOGIC RH(D): CPT

## 2019-04-20 PROCEDURE — 81003 URINALYSIS AUTO W/O SCOPE: CPT

## 2019-04-20 PROCEDURE — 36415 COLL VENOUS BLD VENIPUNCTURE: CPT

## 2019-04-20 PROCEDURE — 87641 MR-STAPH DNA AMP PROBE: CPT

## 2019-04-20 PROCEDURE — 85730 THROMBOPLASTIN TIME PARTIAL: CPT

## 2019-04-20 PROCEDURE — 93005 ELECTROCARDIOGRAM TRACING: CPT

## 2019-04-20 PROCEDURE — 80061 LIPID PANEL: CPT

## 2019-04-20 PROCEDURE — 85025 COMPLETE CBC W/AUTO DIFF WBC: CPT

## 2019-04-20 PROCEDURE — 86900 BLOOD TYPING SEROLOGIC ABO: CPT

## 2019-04-22 ENCOUNTER — TELEPHONE (OUTPATIENT)
Dept: INTERNAL MEDICINE CLINIC | Facility: CLINIC | Age: 69
End: 2019-04-22

## 2019-04-22 NOTE — TELEPHONE ENCOUNTER
Johnny Easton / Kaz sung [phone]     Pt have abnormal EKG pt having surgery next Wednesday like  to review this need a aron     Pt have apt with  this Wednesday

## 2019-04-22 NOTE — PAT NURSING NOTE
Rafael Marcano from Dr. Basil Millan office notified of abnormal EKG, she will notify Dr. Vinh Grey office for clearance.

## 2019-04-24 ENCOUNTER — OFFICE VISIT (OUTPATIENT)
Dept: INTERNAL MEDICINE CLINIC | Facility: CLINIC | Age: 69
End: 2019-04-24
Payer: MEDICARE

## 2019-04-24 VITALS
DIASTOLIC BLOOD PRESSURE: 90 MMHG | BODY MASS INDEX: 40 KG/M2 | TEMPERATURE: 98 F | OXYGEN SATURATION: 98 % | WEIGHT: 300 LBS | HEART RATE: 44 BPM | SYSTOLIC BLOOD PRESSURE: 160 MMHG

## 2019-04-24 DIAGNOSIS — I67.1 CEREBRAL ANEURYSM: ICD-10-CM

## 2019-04-24 DIAGNOSIS — E78.00 HYPERCHOLESTEREMIA: ICD-10-CM

## 2019-04-24 DIAGNOSIS — I10 ESSENTIAL HYPERTENSION: ICD-10-CM

## 2019-04-24 DIAGNOSIS — M15.9 PRIMARY OSTEOARTHRITIS INVOLVING MULTIPLE JOINTS: ICD-10-CM

## 2019-04-24 DIAGNOSIS — M17.11 PRIMARY OSTEOARTHRITIS OF RIGHT KNEE: ICD-10-CM

## 2019-04-24 DIAGNOSIS — Z01.818 PREOPERATIVE GENERAL PHYSICAL EXAMINATION: Primary | ICD-10-CM

## 2019-04-24 DIAGNOSIS — R94.31 ABNORMAL EKG: ICD-10-CM

## 2019-04-24 PROCEDURE — G0463 HOSPITAL OUTPT CLINIC VISIT: HCPCS | Performed by: INTERNAL MEDICINE

## 2019-04-24 PROCEDURE — 99214 OFFICE O/P EST MOD 30 MIN: CPT | Performed by: INTERNAL MEDICINE

## 2019-04-24 NOTE — PATIENT INSTRUCTIONS
1.  Patient is to continue his current diet, medication and activity.   2.  I will refer the patient see either Dr. Diego Phan, Dr. Ashley Dia or Dr. Amanda Murphy for preoperative cardiology evaluation and clearance for evaluation of patient's sinus bradycardia and right bu

## 2019-04-24 NOTE — PROGRESS NOTES
Abisai Daley is a 80year old male who presents for a preoperative history and physical examination and medical clearance examination.   HPI:   Mr. Hal Chi is a 26-year-old white male who was seen today for preoperative evaluation and medic kg)  01/06/17 : 293 lb 12.8 oz (133.3 kg)    Body mass index is 39.58 kg/m². Current Outpatient Medications: AmLODIPine Besylate (NORVASC) 5 MG Oral Tab Take 1 tablet (5 mg total) by mouth daily.  Disp: 90 tablet Rfl: 3   TraMADol HCl 50 MG Oral Tab blurred vision or double vision  HEENT: denies nasal congestion, sinus pain or ST  LUNGS: denies shortness of breath or cough  CARDIOVASCULAR: denies chest pain or pressure or palpitations  GI: denies abdominal pain, N/V, diarrhea, constipation, hematochez Preoperative general physical examination  Patient appears to be stable medically.   However in view of the marked bradycardia on his EKG and right bundle branch block I feel that the patient needs to have cardiology evaluation and cardiology clearance prio CPM.    7. Cerebral aneurysm  Doing well.  Katy Christopher MD  4/24/2019  12:27 PM

## 2019-04-25 ENCOUNTER — CLINICAL ABSTRACT (OUTPATIENT)
Dept: CARDIOLOGY | Age: 69
End: 2019-04-25

## 2019-04-25 ENCOUNTER — TELEPHONE (OUTPATIENT)
Dept: INTERNAL MEDICINE CLINIC | Facility: CLINIC | Age: 69
End: 2019-04-25

## 2019-04-25 RX ORDER — MELOXICAM 15 MG/1
TABLET ORAL
COMMUNITY
Start: 2018-09-27

## 2019-04-25 RX ORDER — ATORVASTATIN CALCIUM 20 MG/1
TABLET, FILM COATED ORAL
COMMUNITY
Start: 2018-08-27

## 2019-04-25 RX ORDER — AMLODIPINE BESYLATE 5 MG/1
5 TABLET ORAL
COMMUNITY
Start: 2019-01-07 | End: 2020-08-14

## 2019-04-25 RX ORDER — ASPIRIN 81 MG/1
TABLET ORAL
COMMUNITY

## 2019-04-25 RX ORDER — TRAMADOL HYDROCHLORIDE 50 MG/1
50 TABLET ORAL
COMMUNITY
Start: 2019-01-07 | End: 2020-01-07

## 2019-04-25 RX ORDER — LISINOPRIL 20 MG/1
20 TABLET ORAL
COMMUNITY
Start: 2018-07-20 | End: 2019-07-20

## 2019-04-25 NOTE — TELEPHONE ENCOUNTER
Called to let Dr Marcelo Page know he was unable to get in to see cardiologists Dr Yobany Apodaca or Marco Sanchez    & will need assistance to obtain an appt    Please call pt to advise [phone]

## 2019-04-25 NOTE — TELEPHONE ENCOUNTER
.  Thank you for doing this. Okay to fax over my last 2 progress notes, especially my note from January which was his annual physical examination. Also, please make copies of recent blood work and EKGs from earlier this year and also from April.   Okay to

## 2019-04-25 NOTE — TELEPHONE ENCOUNTER
1 other piece of information. Patient is currently scheduled for a right total knee replacement with Dr. Jamilah Parmar at [facility] next week. I wrote this to nursing as an FYI to my previous message.   Thank you!!

## 2019-04-25 NOTE — TELEPHONE ENCOUNTER
To Dr. Powers Double - Harman Ledesma/Kalie did not have availability, surgery is May 1 (right TKR) - Dr. Khushboo Mclean could see pt 11:25 tomorrow  - fax over: med list, cardiac testing and last 2 office notes, labs, med list - when /where surgery - fax to: [phone]

## 2019-04-25 NOTE — TELEPHONE ENCOUNTER
1/7/19, 4/24/19 office notes, 1/7/19, 4/20/19 lab results, 1/7/19, 4/20/19 EKGs and medication list with fax cover sheet stating date/place of surgery and surgeon faxed to Dr. Guadalupe Arrow: [phone].

## 2019-04-26 ENCOUNTER — HOSPITAL ENCOUNTER (OUTPATIENT)
Dept: CV DIAGNOSTICS | Facility: HOSPITAL | Age: 69
Discharge: HOME | End: 2019-04-26
Attending: INTERNAL MEDICINE
Payer: MEDICARE

## 2019-04-26 ENCOUNTER — CLINICAL ABSTRACT (OUTPATIENT)
Dept: CARDIOLOGY | Age: 69
End: 2019-04-26

## 2019-04-26 ENCOUNTER — OFFICE VISIT (OUTPATIENT)
Dept: CARDIOLOGY | Age: 69
End: 2019-04-26

## 2019-04-26 VITALS
HEIGHT: 73 IN | SYSTOLIC BLOOD PRESSURE: 138 MMHG | DIASTOLIC BLOOD PRESSURE: 70 MMHG | WEIGHT: 300 LBS | HEART RATE: 84 BPM | BODY MASS INDEX: 39.76 KG/M2 | OXYGEN SATURATION: 97 %

## 2019-04-26 DIAGNOSIS — Z01.810 PRE-OPERATIVE CARDIOVASCULAR EXAMINATION: Primary | ICD-10-CM

## 2019-04-26 DIAGNOSIS — I10 ESSENTIAL HYPERTENSION, BENIGN: ICD-10-CM

## 2019-04-26 DIAGNOSIS — I10 HTN (HYPERTENSION), BENIGN: ICD-10-CM

## 2019-04-26 DIAGNOSIS — R00.1 SEVERE SINUS BRADYCARDIA: ICD-10-CM

## 2019-04-26 DIAGNOSIS — R00.1 BRADYCARDIA, SINUS: ICD-10-CM

## 2019-04-26 LAB
% SHORTENING: NORMAL (ref 28–41)
A' LATERAL: NORMAL
A' MEDIAL: NORMAL
ACQUISITION  TIME: NORMAL
AO CUSP SEP: NORMAL MM (ref 15–26)
AO ROOT: NORMAL MM (ref 20–37)
AORTIC VALVE: NORMAL
AV INSUFFIENCY: NORMAL
AV MEAN GRADE: NORMAL
AV PEAK GRADE: NORMAL
AV VMAX: NORMAL
AVA: NORMAL CM2 (ref 2–?)
DIMENSIONS: NORMAL
DOPPLER: NORMAL
DTI: NORMAL
E' LATERAL: NORMAL
E' MEDIAL: NORMAL
E/A: NORMAL
E/E': NORMAL
EDV: NORMAL ML
EPSS: NORMAL MM (ref 2–10)
FUNCTION: NORMAL
IVC: NORMAL MM (ref 11–25)
IVSD: NORMAL MM (ref 6–11)
L ATRIAL VOLUME: NORMAL CC/M2
L ATRIUM: NORMAL MM (ref 19–40)
LIVDD: NORMAL MM (ref 35–57)
LV EF: 52.5 %
LVIDS: NORMAL MM (ref 20–38)
LVOT DIAMETER: NORMAL MM
LVPWD: NORMAL MM (ref 6–11)
MITRAL VALVE: NORMAL CM2
MV DT: NORMAL
MV INSUFFIENCY: NORMAL
MV MAX A: NORMAL
MV MEAN GRADE: NORMAL
MV PK GRADE: NORMAL
MVA: NORMAL CM2 (ref 4–5)
MVMAX E: NORMAL VELOCITIES M/SEC
PULMONIC VALVE: NORMAL
PV INSUFFIENCY: NORMAL
PV MEAN GRADE: NORMAL
PV PK GRADE: NORMAL
PVMAX: NORMAL
RA: NORMAL
RVIDD: NORMAL MM (ref 9–26)
RVSP: NORMAL
SV: NORMAL ML
TRICUSPID VALVE: NORMAL
TV INSUFFIENCY: NORMAL
TV PK GRADE: NORMAL
TVMAX: NORMAL

## 2019-04-26 PROCEDURE — 93306 TTE W/DOPPLER COMPLETE: CPT | Performed by: INTERNAL MEDICINE

## 2019-04-26 PROCEDURE — 99204 OFFICE O/P NEW MOD 45 MIN: CPT | Performed by: INTERNAL MEDICINE

## 2019-04-26 SDOH — HEALTH STABILITY: PHYSICAL HEALTH: ON AVERAGE, HOW MANY MINUTES DO YOU ENGAGE IN EXERCISE AT THIS LEVEL?: 0 MIN

## 2019-04-26 SDOH — HEALTH STABILITY: PHYSICAL HEALTH: ON AVERAGE, HOW MANY DAYS PER WEEK DO YOU ENGAGE IN MODERATE TO STRENUOUS EXERCISE (LIKE A BRISK WALK)?: 0 DAYS

## 2019-04-26 SDOH — HEALTH STABILITY: MENTAL HEALTH
STRESS IS WHEN SOMEONE FEELS TENSE, NERVOUS, ANXIOUS, OR CAN'T SLEEP AT NIGHT BECAUSE THEIR MIND IS TROUBLED. HOW STRESSED ARE YOU?: NOT AT ALL

## 2019-04-29 ENCOUNTER — TELEPHONE (OUTPATIENT)
Dept: CARDIOLOGY | Age: 69
End: 2019-04-29

## 2019-04-29 DIAGNOSIS — I10 HTN (HYPERTENSION), BENIGN: ICD-10-CM

## 2019-04-29 DIAGNOSIS — Z01.810 PRE-OPERATIVE CARDIOVASCULAR EXAMINATION: ICD-10-CM

## 2019-04-29 DIAGNOSIS — R00.1 BRADYCARDIA, SINUS: ICD-10-CM

## 2019-05-01 ENCOUNTER — APPOINTMENT (OUTPATIENT)
Dept: GENERAL RADIOLOGY | Facility: HOSPITAL | Age: 69
DRG: 470 | End: 2019-05-01
Attending: ORTHOPAEDIC SURGERY
Payer: MEDICARE

## 2019-05-01 ENCOUNTER — ANESTHESIA EVENT (OUTPATIENT)
Dept: SURGERY | Facility: HOSPITAL | Age: 69
DRG: 470 | End: 2019-05-01
Payer: MEDICARE

## 2019-05-01 ENCOUNTER — ANESTHESIA (OUTPATIENT)
Dept: SURGERY | Facility: HOSPITAL | Age: 69
DRG: 470 | End: 2019-05-01
Payer: MEDICARE

## 2019-05-01 ENCOUNTER — HOSPITAL ENCOUNTER (INPATIENT)
Facility: HOSPITAL | Age: 69
LOS: 3 days | Discharge: NURSING, REHAB OR LONG-TERM CARE | DRG: 470 | End: 2019-05-04
Attending: ORTHOPAEDIC SURGERY | Admitting: ORTHOPAEDIC SURGERY
Payer: MEDICARE

## 2019-05-01 PROBLEM — I25.10 CAD (CORONARY ARTERY DISEASE): Chronic | Status: RESOLVED | Noted: 2019-05-01 | Resolved: 2019-05-01

## 2019-05-01 PROBLEM — I25.10 CAD (CORONARY ARTERY DISEASE): Chronic | Status: ACTIVE | Noted: 2019-05-01

## 2019-05-01 PROCEDURE — 99232 SBSQ HOSP IP/OBS MODERATE 35: CPT | Performed by: HOSPITALIST

## 2019-05-01 PROCEDURE — 0SRC0J9 REPLACEMENT OF RIGHT KNEE JOINT WITH SYNTHETIC SUBSTITUTE, CEMENTED, OPEN APPROACH: ICD-10-PCS | Performed by: ORTHOPAEDIC SURGERY

## 2019-05-01 PROCEDURE — 73560 X-RAY EXAM OF KNEE 1 OR 2: CPT | Performed by: ORTHOPAEDIC SURGERY

## 2019-05-01 DEVICE — EVOLUTION® MP TIB KEELED NONPOR SIZE 6+ RIGHT
Type: IMPLANTABLE DEVICE | Site: KNEE | Status: FUNCTIONAL
Brand: EVOLUTION

## 2019-05-01 DEVICE — EVOLUTION®MP FEM CS/CR NON-POR SIZE 6 PRIMARY RIGHT
Type: IMPLANTABLE DEVICE | Site: KNEE | Status: FUNCTIONAL
Brand: EVOLUTION

## 2019-05-01 DEVICE — EVOLUTION® MP™ CS INSERT SIZE 6 STANDARD 14MM RIGHT
Type: IMPLANTABLE DEVICE | Site: KNEE | Status: FUNCTIONAL
Brand: EVOLUTION

## 2019-05-01 DEVICE — ADVANCE® ONLAY ALL-POLY PATELLA 35MM TRI-PEG
Type: IMPLANTABLE DEVICE | Site: KNEE | Status: FUNCTIONAL
Brand: ADVANCE®

## 2019-05-01 DEVICE — BIOMET BC R 1X40 US: Type: IMPLANTABLE DEVICE | Site: KNEE | Status: FUNCTIONAL

## 2019-05-01 RX ORDER — CEFAZOLIN SODIUM/WATER 2 G/20 ML
2 SYRINGE (ML) INTRAVENOUS EVERY 8 HOURS
Status: COMPLETED | OUTPATIENT
Start: 2019-05-01 | End: 2019-05-02

## 2019-05-01 RX ORDER — HYDROMORPHONE HYDROCHLORIDE 1 MG/ML
1.2 INJECTION, SOLUTION INTRAMUSCULAR; INTRAVENOUS; SUBCUTANEOUS EVERY 2 HOUR PRN
Status: DISCONTINUED | OUTPATIENT
Start: 2019-05-01 | End: 2019-05-04

## 2019-05-01 RX ORDER — HYDROCODONE BITARTRATE AND ACETAMINOPHEN 5; 325 MG/1; MG/1
2 TABLET ORAL AS NEEDED
Status: DISCONTINUED | OUTPATIENT
Start: 2019-05-01 | End: 2019-05-01 | Stop reason: HOSPADM

## 2019-05-01 RX ORDER — ONDANSETRON 2 MG/ML
4 INJECTION INTRAMUSCULAR; INTRAVENOUS EVERY 4 HOURS PRN
Status: DISCONTINUED | OUTPATIENT
Start: 2019-05-01 | End: 2019-05-04

## 2019-05-01 RX ORDER — NALBUPHINE HCL 10 MG/ML
2.5 AMPUL (ML) INJECTION EVERY 4 HOURS PRN
Status: ACTIVE | OUTPATIENT
Start: 2019-05-01 | End: 2019-05-02

## 2019-05-01 RX ORDER — METOCLOPRAMIDE HYDROCHLORIDE 5 MG/ML
10 INJECTION INTRAMUSCULAR; INTRAVENOUS EVERY 6 HOURS PRN
Status: DISPENSED | OUTPATIENT
Start: 2019-05-01 | End: 2019-05-03

## 2019-05-01 RX ORDER — FAMOTIDINE 20 MG/1
20 TABLET ORAL 2 TIMES DAILY
Status: DISCONTINUED | OUTPATIENT
Start: 2019-05-01 | End: 2019-05-04

## 2019-05-01 RX ORDER — NALOXONE HYDROCHLORIDE 0.4 MG/ML
80 INJECTION, SOLUTION INTRAMUSCULAR; INTRAVENOUS; SUBCUTANEOUS AS NEEDED
Status: DISCONTINUED | OUTPATIENT
Start: 2019-05-01 | End: 2019-05-01 | Stop reason: HOSPADM

## 2019-05-01 RX ORDER — ONDANSETRON 2 MG/ML
4 INJECTION INTRAMUSCULAR; INTRAVENOUS ONCE AS NEEDED
Status: DISCONTINUED | OUTPATIENT
Start: 2019-05-01 | End: 2019-05-01 | Stop reason: HOSPADM

## 2019-05-01 RX ORDER — ACETAMINOPHEN 325 MG/1
650 TABLET ORAL EVERY 4 HOURS PRN
Status: DISCONTINUED | OUTPATIENT
Start: 2019-05-01 | End: 2019-05-04

## 2019-05-01 RX ORDER — HYDROMORPHONE HYDROCHLORIDE 1 MG/ML
0.6 INJECTION, SOLUTION INTRAMUSCULAR; INTRAVENOUS; SUBCUTANEOUS
Status: ACTIVE | OUTPATIENT
Start: 2019-05-01 | End: 2019-05-02

## 2019-05-01 RX ORDER — HYDROCODONE BITARTRATE AND ACETAMINOPHEN 7.5; 325 MG/1; MG/1
1 TABLET ORAL EVERY 4 HOURS PRN
Status: DISCONTINUED | OUTPATIENT
Start: 2019-05-01 | End: 2019-05-04

## 2019-05-01 RX ORDER — AMLODIPINE BESYLATE 5 MG/1
5 TABLET ORAL DAILY
Status: DISCONTINUED | OUTPATIENT
Start: 2019-05-02 | End: 2019-05-04

## 2019-05-01 RX ORDER — HYDROMORPHONE HYDROCHLORIDE 1 MG/ML
0.4 INJECTION, SOLUTION INTRAMUSCULAR; INTRAVENOUS; SUBCUTANEOUS EVERY 2 HOUR PRN
Status: DISCONTINUED | OUTPATIENT
Start: 2019-05-01 | End: 2019-05-04

## 2019-05-01 RX ORDER — METOCLOPRAMIDE 10 MG/1
10 TABLET ORAL ONCE
Status: COMPLETED | OUTPATIENT
Start: 2019-05-01 | End: 2019-05-01

## 2019-05-01 RX ORDER — NALOXONE HYDROCHLORIDE 0.4 MG/ML
0.08 INJECTION, SOLUTION INTRAMUSCULAR; INTRAVENOUS; SUBCUTANEOUS
Status: ACTIVE | OUTPATIENT
Start: 2019-05-01 | End: 2019-05-02

## 2019-05-01 RX ORDER — MORPHINE SULFATE 10 MG/ML
6 INJECTION, SOLUTION INTRAMUSCULAR; INTRAVENOUS EVERY 10 MIN PRN
Status: DISCONTINUED | OUTPATIENT
Start: 2019-05-01 | End: 2019-05-01 | Stop reason: HOSPADM

## 2019-05-01 RX ORDER — BUPIVACAINE HYDROCHLORIDE 7.5 MG/ML
INJECTION, SOLUTION EPIDURAL; RETROBULBAR AS NEEDED
Status: DISCONTINUED | OUTPATIENT
Start: 2019-05-01 | End: 2019-05-01 | Stop reason: SURG

## 2019-05-01 RX ORDER — LIDOCAINE HYDROCHLORIDE 10 MG/ML
INJECTION, SOLUTION EPIDURAL; INFILTRATION; INTRACAUDAL; PERINEURAL AS NEEDED
Status: DISCONTINUED | OUTPATIENT
Start: 2019-05-01 | End: 2019-05-01 | Stop reason: SURG

## 2019-05-01 RX ORDER — HYDROMORPHONE HYDROCHLORIDE 1 MG/ML
0.2 INJECTION, SOLUTION INTRAMUSCULAR; INTRAVENOUS; SUBCUTANEOUS EVERY 5 MIN PRN
Status: DISCONTINUED | OUTPATIENT
Start: 2019-05-01 | End: 2019-05-01 | Stop reason: HOSPADM

## 2019-05-01 RX ORDER — BISACODYL 10 MG
10 SUPPOSITORY, RECTAL RECTAL
Status: DISCONTINUED | OUTPATIENT
Start: 2019-05-01 | End: 2019-05-04

## 2019-05-01 RX ORDER — SODIUM CHLORIDE 0.9 % (FLUSH) 0.9 %
10 SYRINGE (ML) INJECTION AS NEEDED
Status: DISCONTINUED | OUTPATIENT
Start: 2019-05-01 | End: 2019-05-04

## 2019-05-01 RX ORDER — HYDROCODONE BITARTRATE AND ACETAMINOPHEN 7.5; 325 MG/1; MG/1
1 TABLET ORAL EVERY 6 HOURS PRN
Status: DISPENSED | OUTPATIENT
Start: 2019-05-01 | End: 2019-05-02

## 2019-05-01 RX ORDER — CELECOXIB 200 MG/1
200 CAPSULE ORAL DAILY
Status: DISCONTINUED | OUTPATIENT
Start: 2019-05-01 | End: 2019-05-04

## 2019-05-01 RX ORDER — MIDAZOLAM HYDROCHLORIDE 1 MG/ML
INJECTION INTRAMUSCULAR; INTRAVENOUS AS NEEDED
Status: DISCONTINUED | OUTPATIENT
Start: 2019-05-01 | End: 2019-05-01 | Stop reason: SURG

## 2019-05-01 RX ORDER — DIPHENHYDRAMINE HYDROCHLORIDE 50 MG/ML
12.5 INJECTION INTRAMUSCULAR; INTRAVENOUS EVERY 4 HOURS PRN
Status: ACTIVE | OUTPATIENT
Start: 2019-05-01 | End: 2019-05-02

## 2019-05-01 RX ORDER — SODIUM CHLORIDE, SODIUM LACTATE, POTASSIUM CHLORIDE, CALCIUM CHLORIDE 600; 310; 30; 20 MG/100ML; MG/100ML; MG/100ML; MG/100ML
INJECTION, SOLUTION INTRAVENOUS CONTINUOUS
Status: DISCONTINUED | OUTPATIENT
Start: 2019-05-01 | End: 2019-05-01 | Stop reason: HOSPADM

## 2019-05-01 RX ORDER — HYDROCODONE BITARTRATE AND ACETAMINOPHEN 5; 325 MG/1; MG/1
1 TABLET ORAL AS NEEDED
Status: DISCONTINUED | OUTPATIENT
Start: 2019-05-01 | End: 2019-05-01 | Stop reason: HOSPADM

## 2019-05-01 RX ORDER — HYDROMORPHONE HYDROCHLORIDE 1 MG/ML
0.4 INJECTION, SOLUTION INTRAMUSCULAR; INTRAVENOUS; SUBCUTANEOUS
Status: DISPENSED | OUTPATIENT
Start: 2019-05-01 | End: 2019-05-02

## 2019-05-01 RX ORDER — SODIUM PHOSPHATE, DIBASIC AND SODIUM PHOSPHATE, MONOBASIC 7; 19 G/133ML; G/133ML
1 ENEMA RECTAL ONCE AS NEEDED
Status: DISCONTINUED | OUTPATIENT
Start: 2019-05-01 | End: 2019-05-04

## 2019-05-01 RX ORDER — FAMOTIDINE 10 MG/ML
20 INJECTION, SOLUTION INTRAVENOUS 2 TIMES DAILY
Status: DISCONTINUED | OUTPATIENT
Start: 2019-05-01 | End: 2019-05-04

## 2019-05-01 RX ORDER — HYDROMORPHONE HYDROCHLORIDE 1 MG/ML
0.6 INJECTION, SOLUTION INTRAMUSCULAR; INTRAVENOUS; SUBCUTANEOUS EVERY 5 MIN PRN
Status: DISCONTINUED | OUTPATIENT
Start: 2019-05-01 | End: 2019-05-01 | Stop reason: HOSPADM

## 2019-05-01 RX ORDER — ACETAMINOPHEN 500 MG
1000 TABLET ORAL ONCE
Status: COMPLETED | OUTPATIENT
Start: 2019-05-01 | End: 2019-05-01

## 2019-05-01 RX ORDER — LISINOPRIL 20 MG/1
20 TABLET ORAL DAILY
Status: DISCONTINUED | OUTPATIENT
Start: 2019-05-02 | End: 2019-05-04

## 2019-05-01 RX ORDER — HYDROMORPHONE HYDROCHLORIDE 1 MG/ML
0.4 INJECTION, SOLUTION INTRAMUSCULAR; INTRAVENOUS; SUBCUTANEOUS EVERY 5 MIN PRN
Status: DISCONTINUED | OUTPATIENT
Start: 2019-05-01 | End: 2019-05-01 | Stop reason: HOSPADM

## 2019-05-01 RX ORDER — HYDROCODONE BITARTRATE AND ACETAMINOPHEN 7.5; 325 MG/1; MG/1
2 TABLET ORAL EVERY 4 HOURS PRN
Status: DISCONTINUED | OUTPATIENT
Start: 2019-05-01 | End: 2019-05-04

## 2019-05-01 RX ORDER — ASPIRIN 81 MG/1
81 TABLET ORAL DAILY
Status: DISCONTINUED | OUTPATIENT
Start: 2019-05-02 | End: 2019-05-04

## 2019-05-01 RX ORDER — MORPHINE SULFATE 1 MG/ML
INJECTION, SOLUTION EPIDURAL; INTRATHECAL; INTRAVENOUS
Status: COMPLETED | OUTPATIENT
Start: 2019-05-01 | End: 2019-05-01

## 2019-05-01 RX ORDER — MORPHINE SULFATE 2 MG/ML
2 INJECTION, SOLUTION INTRAMUSCULAR; INTRAVENOUS EVERY 10 MIN PRN
Status: DISCONTINUED | OUTPATIENT
Start: 2019-05-01 | End: 2019-05-01 | Stop reason: HOSPADM

## 2019-05-01 RX ORDER — ACETAMINOPHEN 325 MG/1
650 TABLET ORAL EVERY 6 HOURS PRN
Status: ACTIVE | OUTPATIENT
Start: 2019-05-01 | End: 2019-05-02

## 2019-05-01 RX ORDER — DIPHENHYDRAMINE HCL 25 MG
25 CAPSULE ORAL EVERY 4 HOURS PRN
Status: ACTIVE | OUTPATIENT
Start: 2019-05-01 | End: 2019-05-02

## 2019-05-01 RX ORDER — HALOPERIDOL 5 MG/ML
0.25 INJECTION INTRAMUSCULAR ONCE AS NEEDED
Status: DISCONTINUED | OUTPATIENT
Start: 2019-05-01 | End: 2019-05-01 | Stop reason: HOSPADM

## 2019-05-01 RX ORDER — ATORVASTATIN CALCIUM 20 MG/1
20 TABLET, FILM COATED ORAL NIGHTLY
Status: DISCONTINUED | OUTPATIENT
Start: 2019-05-01 | End: 2019-05-04

## 2019-05-01 RX ORDER — MORPHINE SULFATE 4 MG/ML
4 INJECTION, SOLUTION INTRAMUSCULAR; INTRAVENOUS EVERY 10 MIN PRN
Status: DISCONTINUED | OUTPATIENT
Start: 2019-05-01 | End: 2019-05-01 | Stop reason: HOSPADM

## 2019-05-01 RX ORDER — POLYETHYLENE GLYCOL 3350 17 G/17G
17 POWDER, FOR SOLUTION ORAL DAILY PRN
Status: DISCONTINUED | OUTPATIENT
Start: 2019-05-01 | End: 2019-05-04

## 2019-05-01 RX ORDER — DEXTROSE, SODIUM CHLORIDE, SODIUM LACTATE, POTASSIUM CHLORIDE, AND CALCIUM CHLORIDE 5; .6; .31; .03; .02 G/100ML; G/100ML; G/100ML; G/100ML; G/100ML
INJECTION, SOLUTION INTRAVENOUS CONTINUOUS
Status: DISCONTINUED | OUTPATIENT
Start: 2019-05-01 | End: 2019-05-04

## 2019-05-01 RX ORDER — CYCLOBENZAPRINE HCL 5 MG
5 TABLET ORAL EVERY 8 HOURS PRN
Status: DISCONTINUED | OUTPATIENT
Start: 2019-05-01 | End: 2019-05-04

## 2019-05-01 RX ORDER — HALOPERIDOL 5 MG/ML
0.5 INJECTION INTRAMUSCULAR ONCE AS NEEDED
Status: ACTIVE | OUTPATIENT
Start: 2019-05-01 | End: 2019-05-01

## 2019-05-01 RX ORDER — DIPHENHYDRAMINE HCL 25 MG
25 CAPSULE ORAL EVERY 4 HOURS PRN
Status: DISCONTINUED | OUTPATIENT
Start: 2019-05-01 | End: 2019-05-04

## 2019-05-01 RX ORDER — SODIUM CHLORIDE, SODIUM LACTATE, POTASSIUM CHLORIDE, CALCIUM CHLORIDE 600; 310; 30; 20 MG/100ML; MG/100ML; MG/100ML; MG/100ML
INJECTION, SOLUTION INTRAVENOUS CONTINUOUS
Status: DISCONTINUED | OUTPATIENT
Start: 2019-05-01 | End: 2019-05-04

## 2019-05-01 RX ORDER — ONDANSETRON 2 MG/ML
4 INJECTION INTRAMUSCULAR; INTRAVENOUS ONCE AS NEEDED
Status: DISPENSED | OUTPATIENT
Start: 2019-05-01 | End: 2019-05-01

## 2019-05-01 RX ORDER — HYDROMORPHONE HYDROCHLORIDE 1 MG/ML
0.8 INJECTION, SOLUTION INTRAMUSCULAR; INTRAVENOUS; SUBCUTANEOUS EVERY 2 HOUR PRN
Status: DISCONTINUED | OUTPATIENT
Start: 2019-05-01 | End: 2019-05-04

## 2019-05-01 RX ORDER — DOCUSATE SODIUM 100 MG/1
100 CAPSULE, LIQUID FILLED ORAL 2 TIMES DAILY
Status: DISCONTINUED | OUTPATIENT
Start: 2019-05-01 | End: 2019-05-04

## 2019-05-01 RX ORDER — LIDOCAINE HYDROCHLORIDE 10 MG/ML
INJECTION, SOLUTION INFILTRATION; PERINEURAL
Status: COMPLETED | OUTPATIENT
Start: 2019-05-01 | End: 2019-05-01

## 2019-05-01 RX ORDER — HYDROCODONE BITARTRATE AND ACETAMINOPHEN 7.5; 325 MG/1; MG/1
2 TABLET ORAL EVERY 6 HOURS PRN
Status: ACTIVE | OUTPATIENT
Start: 2019-05-01 | End: 2019-05-02

## 2019-05-01 RX ORDER — FAMOTIDINE 20 MG/1
20 TABLET ORAL ONCE
Status: COMPLETED | OUTPATIENT
Start: 2019-05-01 | End: 2019-05-01

## 2019-05-01 RX ORDER — DIPHENHYDRAMINE HYDROCHLORIDE 50 MG/ML
12.5 INJECTION INTRAMUSCULAR; INTRAVENOUS EVERY 4 HOURS PRN
Status: DISCONTINUED | OUTPATIENT
Start: 2019-05-01 | End: 2019-05-04

## 2019-05-01 RX ORDER — DIPHENHYDRAMINE HYDROCHLORIDE 50 MG/ML
25 INJECTION INTRAMUSCULAR; INTRAVENOUS ONCE AS NEEDED
Status: ACTIVE | OUTPATIENT
Start: 2019-05-01 | End: 2019-05-01

## 2019-05-01 RX ADMIN — SODIUM CHLORIDE, SODIUM LACTATE, POTASSIUM CHLORIDE, CALCIUM CHLORIDE: 600; 310; 30; 20 INJECTION, SOLUTION INTRAVENOUS at 15:01:00

## 2019-05-01 RX ADMIN — SODIUM CHLORIDE, SODIUM LACTATE, POTASSIUM CHLORIDE, CALCIUM CHLORIDE: 600; 310; 30; 20 INJECTION, SOLUTION INTRAVENOUS at 13:36:00

## 2019-05-01 RX ADMIN — BUPIVACAINE HYDROCHLORIDE 1.8 ML: 7.5 INJECTION, SOLUTION EPIDURAL; RETROBULBAR at 12:20:00

## 2019-05-01 RX ADMIN — MORPHINE SULFATE 0.3 MG: 1 INJECTION, SOLUTION EPIDURAL; INTRATHECAL; INTRAVENOUS at 12:21:00

## 2019-05-01 RX ADMIN — LIDOCAINE HYDROCHLORIDE 50 MG: 10 INJECTION, SOLUTION EPIDURAL; INFILTRATION; INTRACAUDAL; PERINEURAL at 12:27:00

## 2019-05-01 RX ADMIN — MIDAZOLAM HYDROCHLORIDE 2 MG: 1 INJECTION INTRAMUSCULAR; INTRAVENOUS at 12:17:00

## 2019-05-01 RX ADMIN — SODIUM CHLORIDE, SODIUM LACTATE, POTASSIUM CHLORIDE, CALCIUM CHLORIDE: 600; 310; 30; 20 INJECTION, SOLUTION INTRAVENOUS at 12:10:00

## 2019-05-01 RX ADMIN — LIDOCAINE HYDROCHLORIDE 3 ML: 10 INJECTION, SOLUTION INFILTRATION; PERINEURAL at 12:21:00

## 2019-05-01 NOTE — CONSULTS
Bailee Kathleen: Lianet Lindquist presents as a 43-year-old male with a long-standing history (at least 10-15 years) of increasing symptoms of right knee pain associated with deformity and swelling.   There is been marked exacerbation of his symptoms over the past DOSE ASA EC 81 MG OR TBEC 1 TABLET DAILY Disp:  Rfl:    TraMADol HCl 50 MG Oral Tab Take 1 tablet (50 mg total) by mouth every 6 (six) hours as needed for Pain.  Disp: 40 tablet Rfl: 3   azithromycin (ZITHROMAX Z-CHLOE) 250 MG Oral Tab Take two tablets by rodrigo    · Negative pivot shift sign is present.     · Equivocal Giorgio’s sign is seen.      · No observable nor palpable soft tissue defects or masses.      · No ecchymosis nor bruising.      · Good range of motion of the ipsilateral hip and ankle is seen. activity, anti-inflammatory medication, repeat intraarticular corticosteroid injection, hyaluronic acid gel injection and surgical treatment options including arthroscopy and total knee arthroplasty.      With regards to the total knee arthroplasty, a disc including peroneal stretch injury (footdrop) requiring additional medical and/or surgical treatment (with increased incidence explained in patients with large deformity and correcting a valgus knee; increased incidence in patients with diabetes/peripheral

## 2019-05-01 NOTE — ANESTHESIA POSTPROCEDURE EVALUATION
Patient: Andrae Phlegm    Procedure Summary     Date:  05/01/19 Room / Location:  [address] [place] [address] MAIN OR 12 / [address] [place] [address] MAIN OR    Anesthesia Start:  2111 Anesthesia Stop:  1320    Procedure:  KNEE TOTAL REPLACEMENT (Right ) Diagnosis:  (degenerative arthritis right kn

## 2019-05-01 NOTE — BRIEF OP NOTE
[facility]   Brief Op Note    Patients Name: Kacey Shoulders  Attending Physician: Raiza Luna MD  Operating Physician: Ryan Wade MD  CSN: [id number]     Location:  OR  MRN: [id number]    YOB: 1950  Admiss

## 2019-05-01 NOTE — ANESTHESIA PROCEDURE NOTES
Spinal Block  Date/Time: 5/1/2019 12:21 PM  Performed by: Shahriar Gallo CRNA  Authorized by: Genaro Fatima MD     Patient Location:  OR  Start Time:  5/1/2019 12:17 PM  End Time:  5/1/2019 12:22 PM  Site identification: surface landmarks    Reason for Blo

## 2019-05-01 NOTE — ANESTHESIA PREPROCEDURE EVALUATION
Anesthesia PreOp Note    HPI:     Isiah Green is a 80year old male who presents for preoperative consultation requested by: Vinny Zhou MD    Date of Surgery: 5/1/2019    Procedure(s):  KNEE TOTAL REPLACEMENT  Indication: degenerative arthrit • NASAL SURG PROC UNLISTED     • OTHER SURGICAL HISTORY  9/30/07    coiling brain aneurysm/[place] [id number], repeat 8/09 two coils         Medications Prior to Admission:   AmLODIPine Besylate (NORVASC) 5 MG Oral Tab Take 1 tablet (5 mg total) by mouth Years of education: Not on file      Highest education level: Not on file    Occupational History      Not on file    Social Needs      Financial resource strain: Not on file      Food insecurity:        Worry: Not on file        Inability: Not on file CREATSERUM 0.83 04/20/2019    GLU 97 04/20/2019    CA 9.3 04/20/2019     Lab Results   Component Value Date    INR 0.97 04/20/2019       Vital Signs: Body mass index is 38.92 kg/m². height is 1.854 m (6' 1\") and weight is 133.8 kg (295 lb).  His oral t

## 2019-05-01 NOTE — PROGRESS NOTES
[facility] HOSP - [facility]    Progress Note    Kacey Shoulders Patient Status:  Inpatient    7/10/1950 MRN [id number]   Location [address] [facility] Attending Raiza Luna MD   Hosp Day # 0 PCP Woody Diaz MD PT/OT. Essential hypertension  [facility]. Hypercholesteremia  [facility].              Results:     Lab Results   Component Value Date    WBC 5.4 04/20/2019    HGB 14.7 04/20/2019    HCT 46.0 04/20/2019    .0 04/20/2019    CREATS

## 2019-05-02 ENCOUNTER — APPOINTMENT (OUTPATIENT)
Dept: GENERAL RADIOLOGY | Facility: HOSPITAL | Age: 69
DRG: 470 | End: 2019-05-02
Attending: HOSPITALIST
Payer: MEDICARE

## 2019-05-02 PROCEDURE — 99233 SBSQ HOSP IP/OBS HIGH 50: CPT | Performed by: HOSPITALIST

## 2019-05-02 PROCEDURE — 71046 X-RAY EXAM CHEST 2 VIEWS: CPT | Performed by: HOSPITALIST

## 2019-05-02 RX ORDER — TRAMADOL HYDROCHLORIDE 50 MG/1
50 TABLET ORAL EVERY 6 HOURS
Status: DISCONTINUED | OUTPATIENT
Start: 2019-05-02 | End: 2019-05-04

## 2019-05-02 RX ORDER — NALOXONE HYDROCHLORIDE 0.4 MG/ML
0.08 INJECTION, SOLUTION INTRAMUSCULAR; INTRAVENOUS; SUBCUTANEOUS
Status: DISCONTINUED | OUTPATIENT
Start: 2019-05-02 | End: 2019-05-03

## 2019-05-02 RX ORDER — ONDANSETRON 2 MG/ML
4 INJECTION INTRAMUSCULAR; INTRAVENOUS EVERY 6 HOURS PRN
Status: DISCONTINUED | OUTPATIENT
Start: 2019-05-02 | End: 2019-05-03

## 2019-05-02 RX ORDER — FUROSEMIDE 10 MG/ML
20 INJECTION INTRAMUSCULAR; INTRAVENOUS ONCE
Status: COMPLETED | OUTPATIENT
Start: 2019-05-02 | End: 2019-05-02

## 2019-05-02 RX ORDER — NALBUPHINE HCL 10 MG/ML
2.5 AMPUL (ML) INJECTION EVERY 4 HOURS PRN
Status: DISCONTINUED | OUTPATIENT
Start: 2019-05-02 | End: 2019-05-03

## 2019-05-02 RX ORDER — DIPHENHYDRAMINE HYDROCHLORIDE 50 MG/ML
12.5 INJECTION INTRAMUSCULAR; INTRAVENOUS EVERY 4 HOURS PRN
Status: DISCONTINUED | OUTPATIENT
Start: 2019-05-02 | End: 2019-05-03

## 2019-05-02 NOTE — PLAN OF CARE
Problem: GASTROINTESTINAL - ADULT  Goal: Minimal or absence of nausea and vomiting  Description  INTERVENTIONS:  - Maintain adequate hydration with IV or PO as ordered and tolerated  - Nasogastric tube to low intermittent suction as ordered  - Evaluate e and physical deficits and behaviors that affect risk of falls.   - [place] fall precautions as indicated by assessment.  - Educate pt/family on patient safety including physical limitations  - Instruct pt to call for assistance with activity based on asse cultural backgrounds into the planning and delivery of care  - Encourage patient/family to participate in care and decision-making at the level they choose  - Honor patient and family perspectives and choices   5/2/2019 0211 by Frieda Bennett RN  Outcome: P

## 2019-05-02 NOTE — PROGRESS NOTES
[facility] HOSP - [facility]    Progress Note    Sara Ray Patient Status:  Inpatient    7/10/1950 MRN [id number]   Location [address] UNIT Attending Sara Flores MD   Hosp Day # 1 PCP Maco Anaya MD Knee (1 Or 2 Views), Right (cpt=[id number])    Result Date: 5/1/2019  CONCLUSION:  1. Right knee arthroplasty.     Dictated by (CST): Avery Molina MD on 5/01/2019 at 17:45     Approved by (CST): Avery Molina MD on 5/01/2019 at 17:46

## 2019-05-02 NOTE — OCCUPATIONAL THERAPY NOTE
OCCUPATIONAL THERAPY EVALUATION - INPATIENT      Room Number: 408/408-A  Evaluation Date: 5/2/2019  Type of Evaluation: Initial  Presenting Problem: R TKA, WBAT    Physician Order: IP Consult to Occupational Therapy  Reason for Therapy: ADL/IADL Dysfunctio Medical History  Past Medical History:   Diagnosis Date   • Aneurysm of unspecified site [facility])    • Cataracts, both eyes    • Hearing impairment     tinnitus   • Hip pain 1/28/2010   • HYPERLIPIDEMIA    • HYPERTENSION    • HYPERTENSION NOS 8/3/2007   • Mix is within functional limits       ACTIVITIES OF DAILY LIVING ASSESSMENT  AM-PAC ‘6-Clicks’ Inpatient Daily Activity Short Form  How much help from another person does the patient currently need…  -   Putting on and taking off regular lower body clothing?:

## 2019-05-02 NOTE — HOME CARE LIAISON
Received referral from Sid Mauricio. Met with patient at the bedside. Patient is agreeable to [facility], pending orders. Residential brochure provided with contact information. All questions addressed and answered.      Patient will ne

## 2019-05-02 NOTE — CM/SW NOTE
Patient is scheduled to transfer to [facility] today, 5.2.19 at 2 pm via [address], quote $39.Updates sent. PCS form in the chart. Room 233  Report [phone].     Joseph Ambriz, [address] [place]

## 2019-05-02 NOTE — PHYSICAL THERAPY NOTE
PT PM session: Per RN Lori Landis a PCA has been ordered for the patient d/t break through pain and pt is requesting rest in bed until PCA can be applied.  Pt is tolerating therex in bed as per his written TKR protocol quad sets, ankle pumps, heel slides x 10 rep

## 2019-05-02 NOTE — PROGRESS NOTES
[facility]    Progress Note    Rayf Hernandez Patient Status:  Inpatient    7/10/1950 MRN [id number]   Location [facility] 4W/SW/SE Attending Jen Haywood MD   Hosp Day # 1 PCP Mirella Beltran MD     Date of Admission: acetaminophen (TYLENOL) tab 650 mg 650 mg Oral Q6H PRN   HYDROcodone-acetaminophen (NORCO) 7.5-325 MG per tab 1 tablet 1 tablet Oral Q6H PRN   HYDROcodone-acetaminophen (NORCO) 7.5-325 MG per tab 2 tablet 2 tablet Oral Q6H PRN   HYDROmorphone HCl (DILAUD (FLEXERIL) tab 5 mg 5 mg Oral Q8H PRN   famoTIDine (PEPCID) tab 20 mg 20 mg Oral BID   Or      famoTIDine (PEPCID) injection 20 mg 20 mg Intravenous BID   ceFAZolin sodium (ANCEF/KEFZOL) 2 GM/20ML premix IV syringe 2 g 2 g Intravenous Q8H   acetaminophen ( Wt 295 lb   SpO2 96%   BMI 38.92 kg/m²   The patient is alert, oriented, and appropriate throughout the examination and in no apparent distress. Vital signs are as enumerated above. No abnormal posturing of the right knee or lower extremity.   Hemovac nitesh

## 2019-05-02 NOTE — CM/SW NOTE
SW met with the patient at bedside. Patient lives with spouse in  2 level house with 2   steps to enter and 10  steps inside. Patient has been independent with all ADL's, ambulation and driving prior to this admission . Patient has no been using a walker or

## 2019-05-02 NOTE — OPERATIVE REPORT
[facility]    PATIENT'S NAME: Tish Peabody   ATTENDING PHYSICIAN: Villa Vega MD   OPERATING PHYSICIAN: Richard Gipson MD   PATIENT ACCOUNT#:   [id number]    LOCATION:  [address] [place] [address] Road #:   [id number]       DATE OF B arthroplasty included, but was not limited to, the 1% to 2% incidence of infection as potential sequela with increased incidence in certain comorbidities discussed despite the use of strict sterile techniques and prophylactic antibiotics; delayed wound hea informed consent had been obtained, and after the patient was assessed and deemed medically stable per Anesthesia and previously his primary care physician, prophylactic antibiotics were administered.   The patient brought to the operating room and placed o Jair line anatomically, and a nice fit was obtained. Two drill holes were placed distally and 2 drill holes placed anteriorly. The 2 drill holes distally were utilized to thad external rotation. Pins were placed anteriorly.   The distal femoral cutt place and the trochlea carve-out was thus completed with a reciprocating saw. Trial reduction was performed with a 10, 12 and 14, and after additional soft tissue balance as enumerated above.   The latter demonstrated full extension, excellent stability, n trial reduction was performed with a size 12 and 14, both of which demonstrated full extension, the latter demonstrated excellent stability, not only in 30, 60, and 90 degrees of flexion, but at full extension to varus and valgus stressing.   Excellent nina 3-0 Monocryl in the superficial subcutaneous tissue, and the skin was closed with staples. A Hemovac drain was connected. A sterile dressing was applied, including a Polar Care.   The patient seemed to tolerate the procedure well, having suffered no appar

## 2019-05-02 NOTE — RESPIRATORY THERAPY NOTE
ROBERT ASSESSMENT:    Pt does not have a previous diagnosis of ROBERT. Pt does not routinely use a CPAP device at home.  Patient refused CPAP

## 2019-05-02 NOTE — PHYSICAL THERAPY NOTE
PHYSICAL THERAPY EVALUATION - INPATIENT     Room Number: 408/408-A  Evaluation Date: 5/2/2019  Type of Evaluation: Initial   Physician Order: PT Eval and Treat    Presenting Problem: TKR RLE  Reason for Therapy: Mobility Dysfunction and Discharge Planning osteoarthritis of right knee    Essential hypertension    Hypercholesteremia      Past Medical History  Past Medical History:   Diagnosis Date   • Aneurysm of unspecified site [facility])    • Cataracts, both eyes    • Hearing impairment     tinnitus   • Hip mckayla functional limits RLE 0-50 degrees    Lower extremity strength is within functional limits RLE 4-/5, LLE 5/5    BALANCE  Static Sitting: Good  Dynamic Sitting: Good  Static Standing: Fair  Dynamic Standing: Fair -         AM-PAC '6-Clicks' INPATIENT SHORT Return home    Goal #1 Patient is able to demonstrate supine - sit EOB @ level: independent     Goal #1   Current Status Min A with LE   Goal #2 Patient is able to demonstrate transfers Sit to/from Stand at assistance level: modified independent with walke

## 2019-05-02 NOTE — PROGRESS NOTES
[facility] - [facility]  Anesthesiology Epidural Follow-up Note  2019    Patient name: Isiah Green 80year old male  : 7/10/1950  MRN: [id number]    Diagnosis: [unfilled]    S/P: rt knee replacement    Pain treatment modality: Duramorph    Curr

## 2019-05-03 PROCEDURE — 99233 SBSQ HOSP IP/OBS HIGH 50: CPT | Performed by: HOSPITALIST

## 2019-05-03 RX ORDER — FUROSEMIDE 10 MG/ML
20 INJECTION INTRAMUSCULAR; INTRAVENOUS ONCE
Status: COMPLETED | OUTPATIENT
Start: 2019-05-03 | End: 2019-05-03

## 2019-05-03 NOTE — PHYSICAL THERAPY NOTE
PHYSICAL THERAPY KNEE TREATMENT NOTE - INPATIENT     Room Number: 917/719-M             Presenting Problem: TKR RLE    Problem List  Active Problems:    Primary osteoarthritis of right knee    Essential hypertension    Hypercholesteremia      PHYSICAL THER down on and standing up from a chair with arms (e.g., wheelchair, bedside commode, etc.): A Little   -   Moving from lying on back to sitting on the side of the bed?: A Little   How much help from another person does the patient currently need. ..   -   Mov feet with assist device: walker - rolling at assistance level: supervision   Goal #3   Current Status CGA with amb 200 ft with RW   Goal #4 Patient will negotiate 10 stairs/one curb w/ assistive device and supervision   Goal #4   Current Status NT   Goal #

## 2019-05-03 NOTE — PROGRESS NOTES
[facility] HOSP - [facility]    Progress Note    Jennyfer Castro Patient Status:  Inpatient    7/10/1950 MRN [id number]   Location [address] UNIT Attending Polly Gamble MD   Hosp Day # 2 PCP Brionna Gilbert MD 03/29/2014    TSH 3.24 12/29/2018    PSA 3.8 12/29/2018       Xr Chest Pa + Lat Chest (cpt=71046)    Result Date: 5/2/2019  CONCLUSION:  1. Mild cardiomegaly and normal pulmonary vascularity. Suboptimal inspiration. No acute airspace disease.     Dictated

## 2019-05-03 NOTE — PLAN OF CARE
Problem: Patient/Family Goals  Goal: Patient/Family Long Term Goal  Description  Patient's Long Term Goal: I want to heal without infection    Interventions:  - post total joint replacement abx  - instruct to keep incision C/D/I  - f/u with Dr. Maria Teresa Judd fever/infection during anticipated neutropenic period  Description  INTERVENTIONS  - Monitor WBC  - Administer growth factors as ordered  - Implement neutropenic guidelines  Outcome: Progressing     Problem: SAFETY ADULT - FALL  Goal: Free from fall injury suction as ordered  - Evaluate effectiveness of ordered antiemetic medications  - Provide nonpharmacologic comfort measures as appropriate  - Advance diet as tolerated, if ordered  - Obtain nutritional consult as needed  - Evaluate fluid balance  Outcome:

## 2019-05-03 NOTE — OCCUPATIONAL THERAPY NOTE
OCCUPATIONAL THERAPY TREATMENT NOTE - INPATIENT    Room Number: 764/677-D         Presenting Problem: R TKA, WBAT     Problem List  Active Problems:    Primary osteoarthritis of right knee    Essential hypertension    Hypercholesteremia      OCCUPATIONAL T Bearing Restriction: R lower extremity  R Upper Extremity: Weight Bearing as Tolerated     R Lower Extremity: Weight Bearing as Tolerated       PAIN ASSESSMENT  Ratin  Location: (R knee)  Management Techniques:  Activity promotion     ACTIVITY TOLERANCE met;Call light within reach;RN aware of session/findings    OT Goals:   Patient will complete LE dressing with SBA  Comment: pt unable to attempt 2/ bulky dressing, pants not fitting    Patient will complete toilet transfer with SBA  Comment: min assist wi

## 2019-05-03 NOTE — CM/SW NOTE
KATELIN followed up with the patient per his request.  He wants to go to rehab, referral sent to [facility] as a back up. KATELIN will continue to follow up. ADDENDUM 5.3.19 2:29 PM    [facility] will not have a bed available until Tue 5.7. 19.     Pa

## 2019-05-03 NOTE — PROGRESS NOTES
[facility]    Progress Note    Heriberto Jules Patient Status:  Inpatient    7/10/1950 MRN [id number]   Location [facility] 4W/SW/SE Attending Amber Hardy MD   Hosp Day # 2 PCP Sravan Dominguez MD     Date of Admission: mg Intravenous Once PRN   [] Naloxone HCl (NARCAN) 0.4 MG/ML injection 0.08 mg 0.08 mg Intravenous Q5 Min PRN   [] acetaminophen (TYLENOL) tab 650 mg 650 mg Oral Q6H PRN   [] HYDROcodone-acetaminophen (NORCO) 7.5-325 MG per tab 1 table Intravenous Q4H PRN   dextrose 5 % /lactated ringers infusion  Intravenous Continuous   apixaban (ELIQUIS) tab 2.5 mg 2.5 mg Oral BID   celecoxib (CeleBREX) cap 200 mg 200 mg Oral Daily   Cyclobenzaprine HCl (FLEXERIL) tab 5 mg 5 mg Oral Q8H PRN   famoTIDi Social History:  Social History    Tobacco Use      Smoking status: Never Smoker      Smokeless tobacco: Former User    Alcohol use: Yes      Comment: 4-5 beers- weekends only    Drug use: No       PHYSICAL EXAM:   /82 (BP Location: Right arm)   Pu (CST):  Irina Ruff MD on 5/01/2019 at 17:45     Approved by (CST): Irina Ruff MD on 5/01/2019 at 17:46                 ASSESSMENT AND PLAN:   Right Knee Primary Osteoarthritis Chronic, Progressive and Advanced Status Post Total Knee Arthroplasty

## 2019-05-04 VITALS
TEMPERATURE: 99 F | BODY MASS INDEX: 39.1 KG/M2 | WEIGHT: 295 LBS | DIASTOLIC BLOOD PRESSURE: 68 MMHG | HEIGHT: 73 IN | RESPIRATION RATE: 18 BRPM | SYSTOLIC BLOOD PRESSURE: 141 MMHG | HEART RATE: 64 BPM | OXYGEN SATURATION: 97 %

## 2019-05-04 PROCEDURE — 99239 HOSP IP/OBS DSCHRG MGMT >30: CPT | Performed by: HOSPITALIST

## 2019-05-04 RX ORDER — TRAMADOL HYDROCHLORIDE 50 MG/1
50 TABLET ORAL EVERY 6 HOURS PRN
Qty: 5 TABLET | Refills: 0 | Status: SHIPPED | OUTPATIENT
Start: 2019-05-04 | End: 2020-07-06

## 2019-05-04 RX ORDER — HYDROCODONE BITARTRATE AND ACETAMINOPHEN 7.5; 325 MG/1; MG/1
1 TABLET ORAL EVERY 4 HOURS PRN
Qty: 10 TABLET | Refills: 0 | Status: SHIPPED | OUTPATIENT
Start: 2019-05-04 | End: 2019-10-14

## 2019-05-04 NOTE — DISCHARGE SUMMARY
[facility]    Discharge Summary    Eddie Billing Patient Status:  Inpatient    7/10/1950 MRN [id number]   Location [facility] 4W/SW/SE Attending Rachel Morales MD   Hosp Day # 3 PCP Christopher Olivares MD     Date of Admiss Stable    Discharge Medications:      Discharge Medications      START taking these medications      Instructions Prescription details   apixaban 2.5 MG Tabs  Commonly known as:  ELIQUIS      Take 1 tablet (2.5 mg total) by mouth 2 (two) times daily for 14

## 2019-05-04 NOTE — PLAN OF CARE
Problem: Patient/Family Goals  Goal: Patient/Family Long Term Goal  Description  Patient's Long Term Goal: I want to heal without infection    Interventions:  - post total joint replacement abx  - instruct to keep incision C/D/I  - f/u with Dr. Henrietta Quinn pre-medicate as appropriate  Outcome: Progressing  Note:   TAKING NORCO AS NEEDED FOR PAIN CONTROL.      Problem: RISK FOR INFECTION - ADULT  Goal: Absence of fever/infection during anticipated neutropenic period  Description  INTERVENTIONS  - Monitor WBC based on physician/LIP order or complex needs related to functional status, cognitive ability or social support system  Outcome: Progressing  Note:   DISCHARGE PLAN IS FOR [address]      Problem: GASTROINTESTINAL - ADULT  Goal: Minimal or absence of nause

## 2019-05-04 NOTE — PLAN OF CARE
Problem: PAIN - ADULT  Goal: Verbalizes/displays adequate comfort level or patient's stated pain goal  Description  INTERVENTIONS:  - Encourage pt to monitor pain and request assistance  - Assess pain using appropriate pain scale  - Administer analgesics resources and transportation as appropriate  - Identify discharge learning needs (meds, wound care, etc)  - Arrange for interpreters to assist at discharge as needed  - Consider post-discharge preferences of patient/family/discharge partner  - Complete PURA

## 2019-05-04 NOTE — PHYSICAL THERAPY NOTE
PHYSICAL THERAPY KNEE TREATMENT NOTE - INPATIENT     Room Number: 728/586-J             Presenting Problem: TKR RLE    Problem List  Active Problems:    Primary osteoarthritis of right knee    Essential hypertension    Hypercholesteremia      PHYSICAL THER have...  -   Turning over in bed (including adjusting bedclothes, sheets and blankets)?: A Little   -   Sitting down on and standing up from a chair with arms (e.g., wheelchair, bedside commode, etc.): A Little   -   Moving from lying on back to sitting on Status SBA with amb 200 ft with RW   Goal #4 Patient will negotiate 10 stairs/one curb w/ assistive device and supervision   Goal #4   Current Status Pt declined   Goal #5 Patient to demonstrate independence with home activity/exercise instructions provide

## 2019-05-04 NOTE — PROGRESS NOTES
[facility]    Progress Note    Luc Smiley Patient Status:  Inpatient    7/10/1950 MRN [id number]   Location [facility] 4W/SW/SE Attending Mis Bennett MD   Hosp Day # 3 PCP Apryl Christie MD     Date of Admission: [] Naloxone HCl (NARCAN) 0.4 MG/ML injection 0.08 mg 0.08 mg Intravenous Q5 Min PRN   [] acetaminophen (TYLENOL) tab 650 mg 650 mg Oral Q6H PRN   [] HYDROcodone-acetaminophen (NORCO) 7.5-325 MG per tab 1 tablet 1 tablet Oral Q6H PRN Intravenous Q4H PRN   dextrose 5 % /lactated ringers infusion  Intravenous Continuous   apixaban (ELIQUIS) tab 2.5 mg 2.5 mg Oral BID   celecoxib (CeleBREX) cap 200 mg 200 mg Oral Daily   Cyclobenzaprine HCl (FLEXERIL) tab 5 mg 5 mg Oral Q8H PRN   famoTIDi Social History:  Social History    Tobacco Use      Smoking status: Never Smoker      Smokeless tobacco: Former User    Alcohol use: Yes      Comment: 4-5 beers- weekends only    Drug use: No       PHYSICAL EXAM:   /68 (BP Location: Right arm)   Pu today with good clinical improvement. Hemodynamically stable  PT/OT twice daily. Continue anticoagulation. Transfer to [facility]           Digital transcription software was utilized to produce this note.  The note was p

## 2019-05-04 NOTE — PLAN OF CARE
Problem: Patient/Family Goals  Goal: Patient/Family Long Term Goal  Description  Patient's Long Term Goal: I want to heal without infection    Interventions:  - post total joint replacement abx  - instruct to keep incision C/D/I  - f/u with Dr. Lauren Hilario fever/infection during anticipated neutropenic period  Description  INTERVENTIONS  - Monitor WBC  - Administer growth factors as ordered  - Implement neutropenic guidelines  Outcome: Progressing     Problem: SAFETY ADULT - FALL  Goal: Free from fall injury suction as ordered  - Evaluate effectiveness of ordered antiemetic medications  - Provide nonpharmacologic comfort measures as appropriate  - Advance diet as tolerated, if ordered  - Obtain nutritional consult as needed  - Evaluate fluid balance  Outcome:

## 2019-05-04 NOTE — CM/SW NOTE
Pt is medically cleared for discharge to rehab at [facility] today. [facility] can accept. Real Rising, Admission Director: [phone]    Nursing report to call @ [phone]  Plan for discharge today,  wife will take him there. Bedside RN notified.

## 2019-05-06 PROCEDURE — 99305 1ST NF CARE MODERATE MDM 35: CPT | Performed by: INTERNAL MEDICINE

## 2019-05-08 PROCEDURE — 99308 SBSQ NF CARE LOW MDM 20: CPT | Performed by: INTERNAL MEDICINE

## 2019-05-10 ENCOUNTER — EXTERNAL FACILITY (OUTPATIENT)
Dept: INTERNAL MEDICINE CLINIC | Facility: CLINIC | Age: 69
End: 2019-05-10

## 2019-05-10 DIAGNOSIS — I10 ESSENTIAL HYPERTENSION: ICD-10-CM

## 2019-05-10 DIAGNOSIS — E78.00 HYPERCHOLESTEREMIA: ICD-10-CM

## 2019-05-10 DIAGNOSIS — Z96.651 S/P TOTAL KNEE ARTHROPLASTY, RIGHT: ICD-10-CM

## 2019-05-10 DIAGNOSIS — M17.11 PRIMARY OSTEOARTHRITIS OF RIGHT KNEE: ICD-10-CM

## 2019-05-10 PROCEDURE — 99315 NF DSCHRG MGMT 30 MIN/LESS: CPT | Performed by: INTERNAL MEDICINE

## 2019-05-22 ENCOUNTER — OFFICE VISIT (OUTPATIENT)
Dept: INTERNAL MEDICINE CLINIC | Facility: CLINIC | Age: 69
End: 2019-05-22
Payer: MEDICARE

## 2019-05-22 VITALS
DIASTOLIC BLOOD PRESSURE: 70 MMHG | SYSTOLIC BLOOD PRESSURE: 108 MMHG | BODY MASS INDEX: 38.19 KG/M2 | HEART RATE: 60 BPM | TEMPERATURE: 99 F | WEIGHT: 282 LBS | HEIGHT: 72 IN | OXYGEN SATURATION: 98 %

## 2019-05-22 DIAGNOSIS — R53.83 FATIGUE, UNSPECIFIED TYPE: ICD-10-CM

## 2019-05-22 DIAGNOSIS — M15.9 PRIMARY OSTEOARTHRITIS INVOLVING MULTIPLE JOINTS: Primary | ICD-10-CM

## 2019-05-22 DIAGNOSIS — I10 ESSENTIAL HYPERTENSION: ICD-10-CM

## 2019-05-22 DIAGNOSIS — E78.00 HYPERCHOLESTEREMIA: ICD-10-CM

## 2019-05-22 DIAGNOSIS — Z96.651 S/P TKR (TOTAL KNEE REPLACEMENT), RIGHT: ICD-10-CM

## 2019-05-22 PROCEDURE — 99214 OFFICE O/P EST MOD 30 MIN: CPT | Performed by: INTERNAL MEDICINE

## 2019-05-22 PROCEDURE — G0463 HOSPITAL OUTPT CLINIC VISIT: HCPCS | Performed by: INTERNAL MEDICINE

## 2019-05-22 NOTE — PATIENT INSTRUCTIONS
1.  Patient is to continue his current diet, medication and activity. 2.  Patient to follow-up with Dr. Fredo Epperson in his rehab as he is currently doing. 3.  I will see patient back in 3 months with blood tests as noted. .  4.  Patient is also to follow-up

## 2019-05-22 NOTE — PROGRESS NOTES
Eddie Tucker is a 80year old male. Patient presents with:  Checkup: 4 week, patient is no longer on eliqius. Patient reports he had an allergic reaction to spinal block.    Arthritis  Hypertension  Hyperlipidemia    HPI:   Patient presents with:  Check NOS 8/3/2007   • Osteoarthritis    • Other abnormal glucose    • Other and unspecified hyperlipidemia    • Special screening for malignant neoplasms, colon 8/3/2007      Social History:  Social History    Tobacco Use      Smoking status: Never Smoker patient back sooner as necessary. 4. Hypercholesteremia  Stable. CPM.  As above. The patient indicates understanding of these issues and agrees to the plan. The patient is asked to return in 3 months with blood tests as noted above. Emilio Lee

## 2019-06-09 PROBLEM — Z96.651 HISTORY OF TOTAL KNEE ARTHROPLASTY, RIGHT: Status: ACTIVE | Noted: 2019-05-22

## 2019-06-17 ENCOUNTER — APPOINTMENT (OUTPATIENT)
Dept: OTHER | Facility: HOSPITAL | Age: 69
End: 2019-06-17
Attending: PREVENTIVE MEDICINE

## 2019-06-17 PROBLEM — R00.1 BRADYCARDIA, SINUS: Status: ACTIVE | Noted: 2019-04-26

## 2019-06-19 ENCOUNTER — APPOINTMENT (OUTPATIENT)
Dept: OTHER | Facility: HOSPITAL | Age: 69
End: 2019-06-19
Attending: PREVENTIVE MEDICINE

## 2019-07-01 PROBLEM — Z96.651 HISTORY OF TOTAL KNEE REPLACEMENT, RIGHT: Status: ACTIVE | Noted: 2019-05-22

## 2019-07-01 PROBLEM — M21.40 FLAT FOOT: Status: ACTIVE | Noted: 2019-07-01

## 2019-09-05 ENCOUNTER — TELEPHONE (OUTPATIENT)
Dept: GASTROENTEROLOGY | Facility: CLINIC | Age: 69
End: 2019-09-05

## 2019-09-09 RX ORDER — LISINOPRIL 20 MG/1
20 TABLET ORAL DAILY
Qty: 90 TABLET | Refills: 3 | Status: SHIPPED | OUTPATIENT
Start: 2019-09-09 | End: 2020-09-20

## 2019-09-09 NOTE — TELEPHONE ENCOUNTER
Pt called for refill of Lisinopril   - he is out of medication   - please send prescription to [facility] in [place] today

## 2019-10-07 RX ORDER — ATORVASTATIN CALCIUM 20 MG/1
TABLET, FILM COATED ORAL
Qty: 90 TABLET | Refills: 3 | Status: SHIPPED | OUTPATIENT
Start: 2019-10-07 | End: 2020-10-20

## 2019-10-11 ENCOUNTER — TELEPHONE (OUTPATIENT)
Dept: INTERNAL MEDICINE CLINIC | Facility: CLINIC | Age: 69
End: 2019-10-11

## 2019-10-14 RX ORDER — MELOXICAM 15 MG/1
TABLET ORAL
Qty: 30 TABLET | Refills: 11 | Status: SHIPPED | OUTPATIENT
Start: 2019-10-14 | End: 2020-11-02

## 2019-10-14 NOTE — TELEPHONE ENCOUNTER
Noted. Please call patient to see if he is still taking the meloxicam as we thought he had stopped it following his surgery. Please let me know what he says. I will route this to nursing.   Thank you!!

## 2019-12-08 ENCOUNTER — LAB ENCOUNTER (OUTPATIENT)
Dept: LAB | Facility: HOSPITAL | Age: 69
End: 2019-12-08
Attending: INTERNAL MEDICINE
Payer: MEDICARE

## 2019-12-08 DIAGNOSIS — Z96.651 S/P TKR (TOTAL KNEE REPLACEMENT), RIGHT: ICD-10-CM

## 2019-12-08 DIAGNOSIS — I10 ESSENTIAL HYPERTENSION: ICD-10-CM

## 2019-12-08 DIAGNOSIS — E78.00 HYPERCHOLESTEREMIA: ICD-10-CM

## 2019-12-08 DIAGNOSIS — R53.83 FATIGUE, UNSPECIFIED TYPE: ICD-10-CM

## 2019-12-08 PROCEDURE — 80048 BASIC METABOLIC PNL TOTAL CA: CPT

## 2019-12-08 PROCEDURE — 84460 ALANINE AMINO (ALT) (SGPT): CPT

## 2019-12-08 PROCEDURE — 85025 COMPLETE CBC W/AUTO DIFF WBC: CPT

## 2019-12-08 PROCEDURE — 36415 COLL VENOUS BLD VENIPUNCTURE: CPT

## 2019-12-08 PROCEDURE — 80061 LIPID PANEL: CPT

## 2019-12-08 PROCEDURE — 84450 TRANSFERASE (AST) (SGOT): CPT

## 2019-12-13 ENCOUNTER — OFFICE VISIT (OUTPATIENT)
Dept: INTERNAL MEDICINE CLINIC | Facility: CLINIC | Age: 69
End: 2019-12-13
Payer: MEDICARE

## 2019-12-13 VITALS
TEMPERATURE: 98 F | DIASTOLIC BLOOD PRESSURE: 70 MMHG | HEART RATE: 60 BPM | OXYGEN SATURATION: 98 % | WEIGHT: 272 LBS | BODY MASS INDEX: 36.84 KG/M2 | SYSTOLIC BLOOD PRESSURE: 124 MMHG | HEIGHT: 72 IN

## 2019-12-13 DIAGNOSIS — I10 ESSENTIAL HYPERTENSION: ICD-10-CM

## 2019-12-13 DIAGNOSIS — Z12.5 PROSTATE CANCER SCREENING: ICD-10-CM

## 2019-12-13 DIAGNOSIS — E78.00 HYPERCHOLESTEREMIA: ICD-10-CM

## 2019-12-13 DIAGNOSIS — Z00.00 ANNUAL PHYSICAL EXAM: ICD-10-CM

## 2019-12-13 DIAGNOSIS — Z96.651 HISTORY OF TOTAL KNEE REPLACEMENT, RIGHT: ICD-10-CM

## 2019-12-13 DIAGNOSIS — R53.83 FATIGUE, UNSPECIFIED TYPE: ICD-10-CM

## 2019-12-13 DIAGNOSIS — M15.9 PRIMARY OSTEOARTHRITIS INVOLVING MULTIPLE JOINTS: Primary | ICD-10-CM

## 2019-12-13 PROCEDURE — 99214 OFFICE O/P EST MOD 30 MIN: CPT | Performed by: INTERNAL MEDICINE

## 2019-12-13 PROCEDURE — G0463 HOSPITAL OUTPT CLINIC VISIT: HCPCS | Performed by: INTERNAL MEDICINE

## 2019-12-13 RX ORDER — AMLODIPINE BESYLATE 5 MG/1
5 TABLET ORAL DAILY
Qty: 90 TABLET | Refills: 3 | Status: SHIPPED | OUTPATIENT
Start: 2019-12-13 | End: 2019-12-13

## 2019-12-13 RX ORDER — AMLODIPINE BESYLATE 5 MG/1
5 TABLET ORAL DAILY
Qty: 90 TABLET | Refills: 3 | Status: SHIPPED | OUTPATIENT
Start: 2019-12-13 | End: 2020-07-06

## 2019-12-13 NOTE — PROGRESS NOTES
Sammie Prado is a 77year old male. Patient presents with:  Checkup: 3 month   Arthritis  Hypertension  Hyperlipidemia    HPI:   Patient presents with:  Checkup: 3 month   Arthritis  Hypertension  Hyperlipidemia    She feels well.   He had right total k HEALTH: feels well otherwise  RESPIRATORY:No cough or SOB  CARDIOVASCULAR: No chest pain  GI: No abdominal pain, nausea, vomiting, diarrhea, or constipation  :No Urinary complaints  EXT:No complaints of pain or swelling in patient's legs    EXAM:   BP 12 above. Patient's recent lipid panel had a cholesterol of 140, triglycerides were 59, HDL cholesterol 67 and LDL cholesterol was 61. Patient's AST was 26 and ALT was 36. The patient indicates understanding of these issues and agrees to the plan.   The

## 2019-12-13 NOTE — PATIENT INSTRUCTIONS
1.  Patient is to continue his current diet, medication and activity. 2.  Patient is to continue to watch his diet and exercise and continue his weight loss program.  3.  Patient will be given his Pneumovax 23 vaccine today.   4.  I will plan to see the pa

## 2020-05-29 ENCOUNTER — HOSPITAL ENCOUNTER (EMERGENCY)
Facility: HOSPITAL | Age: 70
Discharge: HOME | End: 2020-05-29
Attending: EMERGENCY MEDICINE
Payer: MEDICARE

## 2020-05-29 VITALS
DIASTOLIC BLOOD PRESSURE: 84 MMHG | HEIGHT: 73 IN | TEMPERATURE: 99 F | HEART RATE: 50 BPM | WEIGHT: 260 LBS | OXYGEN SATURATION: 97 % | SYSTOLIC BLOOD PRESSURE: 156 MMHG | RESPIRATION RATE: 18 BRPM | BODY MASS INDEX: 34.46 KG/M2

## 2020-05-29 DIAGNOSIS — Z20.822 EXPOSURE TO COVID-19 VIRUS: Primary | ICD-10-CM

## 2020-05-29 PROCEDURE — 99283 EMERGENCY DEPT VISIT LOW MDM: CPT

## 2020-05-29 NOTE — ED PROVIDER NOTES
Patient Seen in: [facility] Emergency Department    History   No chief complaint on file. Stated Complaint: covid testing no symptoms    HPI    Patient wanted to be tested for COVID. He was exposed to a patient with COVID this morning.   He was total) by mouth every 6 (six) hours as needed for Pain.   Patient not taking: Reported on 5/29/2020          Review of Systems  Constitutional: no fever, normal appetite, normal energy, has otherwise been feeling well      Positive for stated complaint: cov diagnosis)    Disposition:  Discharge    Follow-up:  Stacie Mejía MD  . [facility] [address] [id number]  [phone]            Medications Prescribed:  Current Discharge Medication List

## 2020-05-29 NOTE — ED INITIAL ASSESSMENT (HPI)
Pt was exposed to a covid+ patient in the operating room around 1030 today, requesting covid testing, denies symptoms.

## 2020-06-27 ENCOUNTER — LAB ENCOUNTER (OUTPATIENT)
Dept: LAB | Age: 70
End: 2020-06-27
Attending: INTERNAL MEDICINE
Payer: MEDICARE

## 2020-06-27 DIAGNOSIS — Z12.5 PROSTATE CANCER SCREENING: ICD-10-CM

## 2020-06-27 DIAGNOSIS — E78.00 HYPERCHOLESTEREMIA: ICD-10-CM

## 2020-06-27 DIAGNOSIS — R53.83 FATIGUE, UNSPECIFIED TYPE: ICD-10-CM

## 2020-06-27 DIAGNOSIS — Z00.00 ANNUAL PHYSICAL EXAM: ICD-10-CM

## 2020-06-27 LAB
ALBUMIN SERPL-MCNC: 3.7 G/DL
ALBUMIN/GLOB SERPL: 1.3 {RATIO}
ALP SERPL-CCNC: 66 U/L
ALT SERPL-CCNC: 44 UNITS/L
ANION GAP SERPL CALC-SCNC: 3 MMOL/L
AST SERPL-CCNC: 26 UNITS/L
BILIRUB SERPL-MCNC: 0.4 MG/DL
BUN SERPL-MCNC: 27 MG/DL
BUN/CREAT SERPL: 30.3
CALCIUM SERPL-MCNC: 9.3 MG/DL
CHLORIDE SERPL-SCNC: 110 MMOL/L
CHOLEST SERPL-MCNC: 135 MG/DL
CO2 SERPL-SCNC: 30 MMOL/L
CREAT SERPL-MCNC: 0.89 MG/DL
GLOBULIN SER-MCNC: 2.9 G/DL
GLUCOSE SERPL-MCNC: 101 MG/DL
HCT VFR BLD CALC: 45.1 %
HDLC SERPL-MCNC: 51 MG/DL
HGB BLD-MCNC: 14.9 G/DL
LDLC SERPL CALC-MCNC: 72 MG/DL
NONHDLC SERPL-MCNC: 84 MG/DL
PLATELET # BLD: 166 K/MCL
POTASSIUM SERPL-SCNC: 4.9 MMOL/L
PROT SERPL-MCNC: 6.6 G/DL
RBC # BLD: 4.87 10*6/UL
SODIUM SERPL-SCNC: 143 MMOL/L
TRIGL SERPL-MCNC: 61 MG/DL
TSH SERPL-ACNC: 2.78 MCUNITS/ML
VLDLC SERPL CALC-MCNC: 12 MG/DL
WBC # BLD: 5.2 K/MCL

## 2020-06-27 PROCEDURE — 81001 URINALYSIS AUTO W/SCOPE: CPT | Performed by: INTERNAL MEDICINE

## 2020-06-27 PROCEDURE — 84443 ASSAY THYROID STIM HORMONE: CPT

## 2020-06-27 PROCEDURE — 36415 COLL VENOUS BLD VENIPUNCTURE: CPT

## 2020-06-27 PROCEDURE — 80061 LIPID PANEL: CPT

## 2020-06-27 PROCEDURE — 85025 COMPLETE CBC W/AUTO DIFF WBC: CPT

## 2020-06-27 PROCEDURE — 80053 COMPREHEN METABOLIC PANEL: CPT

## 2020-07-06 ENCOUNTER — OFFICE VISIT (OUTPATIENT)
Dept: INTERNAL MEDICINE CLINIC | Facility: CLINIC | Age: 70
End: 2020-07-06
Payer: MEDICARE

## 2020-07-06 VITALS
HEART RATE: 40 BPM | SYSTOLIC BLOOD PRESSURE: 110 MMHG | OXYGEN SATURATION: 99 % | BODY MASS INDEX: 38.17 KG/M2 | TEMPERATURE: 98 F | DIASTOLIC BLOOD PRESSURE: 80 MMHG | WEIGHT: 288 LBS | HEIGHT: 73 IN

## 2020-07-06 DIAGNOSIS — R94.31 ABNORMAL EKG: ICD-10-CM

## 2020-07-06 DIAGNOSIS — I67.1 CEREBRAL ANEURYSM: ICD-10-CM

## 2020-07-06 DIAGNOSIS — E78.00 HYPERCHOLESTEREMIA: ICD-10-CM

## 2020-07-06 DIAGNOSIS — R00.1 BRADYCARDIA: ICD-10-CM

## 2020-07-06 DIAGNOSIS — M15.9 PRIMARY OSTEOARTHRITIS INVOLVING MULTIPLE JOINTS: ICD-10-CM

## 2020-07-06 DIAGNOSIS — R97.20 ELEVATED PSA: ICD-10-CM

## 2020-07-06 DIAGNOSIS — Z96.651 HISTORY OF TOTAL KNEE REPLACEMENT, RIGHT: ICD-10-CM

## 2020-07-06 DIAGNOSIS — I10 ESSENTIAL HYPERTENSION: ICD-10-CM

## 2020-07-06 DIAGNOSIS — Z00.00 ANNUAL PHYSICAL EXAM: Primary | ICD-10-CM

## 2020-07-06 LAB
OCCULT BLOOD, STOOL 1: NEGATIVE
PERFORMANCE MONITORS CORRECT (YES/NO): YES YES/NO

## 2020-07-06 PROCEDURE — 93005 ELECTROCARDIOGRAM TRACING: CPT | Performed by: INTERNAL MEDICINE

## 2020-07-06 PROCEDURE — 82272 OCCULT BLD FECES 1-3 TESTS: CPT | Performed by: INTERNAL MEDICINE

## 2020-07-06 PROCEDURE — 93010 ELECTROCARDIOGRAM REPORT: CPT | Performed by: INTERNAL MEDICINE

## 2020-07-06 PROCEDURE — G0463 HOSPITAL OUTPT CLINIC VISIT: HCPCS | Performed by: INTERNAL MEDICINE

## 2020-07-06 PROCEDURE — 99214 OFFICE O/P EST MOD 30 MIN: CPT | Performed by: INTERNAL MEDICINE

## 2020-07-06 PROCEDURE — G0439 PPPS, SUBSEQ VISIT: HCPCS | Performed by: INTERNAL MEDICINE

## 2020-07-06 NOTE — PROGRESS NOTES
Mando Mercado is a 77year old male who presents for a complete physical exam.   HPI:   Mr. Mihai Ruiz is a 50-year-old white male who was seen by me on July 6, 2020 for his Medicare annual physical examination.   At the time of examination  • KNEE ARTHROSCOPY Left    • KNEE REPLACEMENT SURGERY     • KNEE TOTAL REPLACEMENT Right 5/1/2019    Performed by Radha Bautista MD at [address]   • NASAL SURG [address] UNLISTED     • OTHER SURGICAL HISTORY  9/30/07    coiling brain aneurysm/[place] male, No hernia noted  RECTAL:  Stool is brown and is negative for Occult blood. Prostate is 1+ enlarged with no palpable nodules  MUSCULOSKELETAL: back is not tender. No pain in legs. Patient has 1+ edema noted over lower legs, ankles and feet.   EXTREM orally daily. I will see the patient back in 1 month as noted above. 3. Essential hypertension  Patient's blood pressure is doing well. We will continue to monitor the patient's blood pressure in the future.   I will see the patient back in 1 month for female age 47-67, do you take aspirin?: Yes    Have you had any immunizations at another office such as Influenza, Hepatitis B, Tetanus, or Pneumococcal?: No     Functional Ability     Bathing or Showering: Able without help    Toileting: Able without help check with your insurance carrier before scheduling to verify coverage.     PREVENTATIVE SERVICES  INDICATIONS AND SCHEDULE Internal Lab or Procedure External Lab or Procedure   Diabetes Screening      HbgA1C   Annually HEMOGLOBIN A1C (%)   Date Value   07/ or performed in visit on 04/12/17   • TETANUS, DIPHTHERIA TOXOIDS AND ACELLULAR PERTUSIS VACCINE (TDAP), >7 YEARS, IM USE    Update Immunization Activity if applicable    Zoster (Not covered by Medicare Part B) No orders found for this or any previous visi

## 2020-07-06 NOTE — PATIENT INSTRUCTIONS
1.  Patient is to continue his current diet, medication and activity. 2.  I will stop the patient's amlodipine/Norvasc and monitor how the patient's pulse does. 3.  I will see the patient back in 1 month to reevaluate his blood pressure and pulse. 4.   P

## 2020-08-07 ENCOUNTER — OFFICE VISIT (OUTPATIENT)
Dept: INTERNAL MEDICINE CLINIC | Facility: CLINIC | Age: 70
End: 2020-08-07
Payer: MEDICARE

## 2020-08-07 VITALS
BODY MASS INDEX: 37.69 KG/M2 | HEIGHT: 73 IN | WEIGHT: 284.38 LBS | DIASTOLIC BLOOD PRESSURE: 76 MMHG | TEMPERATURE: 98 F | HEART RATE: 44 BPM | OXYGEN SATURATION: 98 % | SYSTOLIC BLOOD PRESSURE: 154 MMHG

## 2020-08-07 DIAGNOSIS — R00.1 BRADYCARDIA: Primary | ICD-10-CM

## 2020-08-07 DIAGNOSIS — E78.00 HYPERCHOLESTEREMIA: ICD-10-CM

## 2020-08-07 DIAGNOSIS — M15.9 PRIMARY OSTEOARTHRITIS INVOLVING MULTIPLE JOINTS: ICD-10-CM

## 2020-08-07 DIAGNOSIS — I10 ESSENTIAL HYPERTENSION: ICD-10-CM

## 2020-08-07 PROCEDURE — G0463 HOSPITAL OUTPT CLINIC VISIT: HCPCS | Performed by: INTERNAL MEDICINE

## 2020-08-07 PROCEDURE — 99214 OFFICE O/P EST MOD 30 MIN: CPT | Performed by: INTERNAL MEDICINE

## 2020-08-07 RX ORDER — TRIAMTERENE AND HYDROCHLOROTHIAZIDE 37.5; 25 MG/1; MG/1
1 CAPSULE ORAL EVERY MORNING
Qty: 30 CAPSULE | Refills: 11 | Status: SHIPPED | OUTPATIENT
Start: 2020-08-07 | End: 2020-10-26 | Stop reason: ALTCHOICE

## 2020-08-07 RX ORDER — TRIAMTERENE AND HYDROCHLOROTHIAZIDE 37.5; 25 MG/1; MG/1
1 CAPSULE ORAL EVERY MORNING
Qty: 30 CAPSULE | Refills: 11 | Status: SHIPPED | OUTPATIENT
Start: 2020-08-07 | End: 2020-08-07

## 2020-08-07 NOTE — PATIENT INSTRUCTIONS
1.  Patient is to continue his current diet, medication and activity. 2.  I will start the patient on Dyazide 1 tablet orally daily to assist with his leg swelling and blood pressure.   3.  I will refer the patient see either Dr. Micheal Hart or Dr. Benito Dasilva for

## 2020-08-07 NOTE — PROGRESS NOTES
Sahil Ellis is a 72year old male. Patient presents with:  Checkup: 1 month. Wearing knee high compression stockings, still feels swelling in legs and feet. Bradycardia  Hypertension    HPI:   Patient presents with:  Checkup: 1 month.  Wearing knee h Smokeless tobacco: Former User    Alcohol use: Yes      Comment: 4-5 beers- weekends only    Drug use: No       REVIEW OF SYSTEMS:   GENERAL HEALTH: feels well otherwise  RESPIRATORY:No cough or SOB  CARDIOVASCULAR: No chest pain  GI: No abdominal pain, na Patient will call me if he has difficulty making arrangements to see 1 of the 2 doctors. He will also call me if he has more problems. The patient indicates understanding of these issues and agrees to the plan.   The patient is asked to return in 2 opal

## 2020-08-12 RX ORDER — LISINOPRIL 20 MG/1
20 TABLET ORAL DAILY
COMMUNITY
Start: 2019-09-09 | End: 2020-09-08

## 2020-08-12 RX ORDER — TRIAMTERENE AND HYDROCHLOROTHIAZIDE 37.5; 25 MG/1; MG/1
1 CAPSULE ORAL DAILY
COMMUNITY
Start: 2020-08-07 | End: 2021-08-07

## 2020-08-14 ENCOUNTER — ANCILLARY PROCEDURE (OUTPATIENT)
Dept: CARDIOLOGY | Age: 70
End: 2020-08-14
Attending: INTERNAL MEDICINE

## 2020-08-14 ENCOUNTER — OFFICE VISIT (OUTPATIENT)
Dept: CARDIOLOGY | Age: 70
End: 2020-08-14

## 2020-08-14 VITALS
OXYGEN SATURATION: 97 % | BODY MASS INDEX: 36.45 KG/M2 | DIASTOLIC BLOOD PRESSURE: 68 MMHG | HEART RATE: 55 BPM | HEIGHT: 73 IN | SYSTOLIC BLOOD PRESSURE: 120 MMHG | WEIGHT: 275 LBS

## 2020-08-14 DIAGNOSIS — R00.1 BRADYCARDIA, UNSPECIFIED: ICD-10-CM

## 2020-08-14 DIAGNOSIS — I10 HTN (HYPERTENSION), BENIGN: Primary | ICD-10-CM

## 2020-08-14 DIAGNOSIS — R00.1 BRADYCARDIA, SINUS: ICD-10-CM

## 2020-08-14 DIAGNOSIS — I10 HTN (HYPERTENSION), BENIGN: ICD-10-CM

## 2020-08-14 PROCEDURE — 99214 OFFICE O/P EST MOD 30 MIN: CPT | Performed by: INTERNAL MEDICINE

## 2020-08-14 PROCEDURE — 93000 ELECTROCARDIOGRAM COMPLETE: CPT | Performed by: INTERNAL MEDICINE

## 2020-08-14 ASSESSMENT — PATIENT HEALTH QUESTIONNAIRE - PHQ9
CLINICAL INTERPRETATION OF PHQ2 SCORE: NO FURTHER SCREENING NEEDED
1. LITTLE INTEREST OR PLEASURE IN DOING THINGS: NOT AT ALL
2. FEELING DOWN, DEPRESSED OR HOPELESS: NOT AT ALL
SUM OF ALL RESPONSES TO PHQ9 QUESTIONS 1 AND 2: 0
SUM OF ALL RESPONSES TO PHQ9 QUESTIONS 1 AND 2: 0
CLINICAL INTERPRETATION OF PHQ9 SCORE: NO FURTHER SCREENING NEEDED

## 2020-08-20 PROCEDURE — 93227 XTRNL ECG REC<48 HR R&I: CPT | Performed by: INTERNAL MEDICINE

## 2020-08-21 ENCOUNTER — TELEPHONE (OUTPATIENT)
Dept: CARDIOLOGY | Age: 70
End: 2020-08-21

## 2020-09-20 RX ORDER — LISINOPRIL 20 MG/1
TABLET ORAL
Qty: 90 TABLET | Refills: 3 | Status: SHIPPED | OUTPATIENT
Start: 2020-09-20 | End: 2021-10-22

## 2020-10-19 ENCOUNTER — LAB ENCOUNTER (OUTPATIENT)
Dept: LAB | Age: 70
End: 2020-10-19
Attending: INTERNAL MEDICINE
Payer: MEDICARE

## 2020-10-19 DIAGNOSIS — E78.00 HYPERCHOLESTEREMIA: ICD-10-CM

## 2020-10-19 DIAGNOSIS — R97.20 ELEVATED PSA: ICD-10-CM

## 2020-10-19 DIAGNOSIS — I10 ESSENTIAL HYPERTENSION: ICD-10-CM

## 2020-10-19 DIAGNOSIS — R00.1 BRADYCARDIA: ICD-10-CM

## 2020-10-19 PROCEDURE — 80061 LIPID PANEL: CPT

## 2020-10-19 PROCEDURE — 84450 TRANSFERASE (AST) (SGOT): CPT

## 2020-10-19 PROCEDURE — 36415 COLL VENOUS BLD VENIPUNCTURE: CPT

## 2020-10-19 PROCEDURE — 84460 ALANINE AMINO (ALT) (SGPT): CPT

## 2020-10-19 PROCEDURE — 80048 BASIC METABOLIC PNL TOTAL CA: CPT

## 2020-10-19 PROCEDURE — 84153 ASSAY OF PSA TOTAL: CPT

## 2020-10-20 RX ORDER — ATORVASTATIN CALCIUM 20 MG/1
TABLET, FILM COATED ORAL
Qty: 90 TABLET | Refills: 3 | Status: SHIPPED | OUTPATIENT
Start: 2020-10-20 | End: 2021-11-19

## 2020-10-26 ENCOUNTER — OFFICE VISIT (OUTPATIENT)
Dept: INTERNAL MEDICINE CLINIC | Facility: CLINIC | Age: 70
End: 2020-10-26
Payer: MEDICARE

## 2020-10-26 VITALS
BODY MASS INDEX: 38.43 KG/M2 | HEART RATE: 48 BPM | HEIGHT: 73 IN | OXYGEN SATURATION: 98 % | SYSTOLIC BLOOD PRESSURE: 140 MMHG | RESPIRATION RATE: 16 BRPM | WEIGHT: 290 LBS | TEMPERATURE: 98 F | DIASTOLIC BLOOD PRESSURE: 70 MMHG

## 2020-10-26 DIAGNOSIS — M15.9 PRIMARY OSTEOARTHRITIS INVOLVING MULTIPLE JOINTS: ICD-10-CM

## 2020-10-26 DIAGNOSIS — R00.1 BRADYCARDIA: ICD-10-CM

## 2020-10-26 DIAGNOSIS — I87.2 VENOUS INSUFFICIENCY: ICD-10-CM

## 2020-10-26 DIAGNOSIS — E87.5 HYPERKALEMIA: ICD-10-CM

## 2020-10-26 DIAGNOSIS — E78.00 HYPERCHOLESTEREMIA: ICD-10-CM

## 2020-10-26 DIAGNOSIS — E83.52 HYPERCALCEMIA: ICD-10-CM

## 2020-10-26 DIAGNOSIS — I10 ESSENTIAL HYPERTENSION: Primary | ICD-10-CM

## 2020-10-26 PROCEDURE — 90732 PPSV23 VACC 2 YRS+ SUBQ/IM: CPT | Performed by: INTERNAL MEDICINE

## 2020-10-26 PROCEDURE — G0009 ADMIN PNEUMOCOCCAL VACCINE: HCPCS | Performed by: INTERNAL MEDICINE

## 2020-10-26 PROCEDURE — G0463 HOSPITAL OUTPT CLINIC VISIT: HCPCS | Performed by: INTERNAL MEDICINE

## 2020-10-26 PROCEDURE — 99214 OFFICE O/P EST MOD 30 MIN: CPT | Performed by: INTERNAL MEDICINE

## 2020-10-26 RX ORDER — FUROSEMIDE 20 MG/1
20 TABLET ORAL DAILY
Qty: 90 TABLET | Refills: 3 | Status: SHIPPED | OUTPATIENT
Start: 2020-10-26 | End: 2021-11-19

## 2020-10-26 NOTE — PATIENT INSTRUCTIONS
1.  Patient is to continue his current diet, medication and activity. 2.  Patient be given his Pneumovax 23 vaccine today. 3.  I will stop the patient's Dyazide and switch him to Lasix/furosemide 20 mg orally every morning.   4.  I will place an order in

## 2020-10-26 NOTE — PROGRESS NOTES
Heriberto Jules is a 72year old male. Patient presents with:  Checkup: 2 month  Hypertension  Bradycardia    HPI:   Patient presents with:  Checkup: 2 month  Hypertension  Bradycardia    Pt feels well. Pt feels that his right knee is doing better.   Pt h Left arm, Patient Position: Sitting, Cuff Size: large)   Pulse (!) 48   Temp 98 °F (36.7 °C) (Temporal)   Resp 16   Ht 6' 1\" (1.854 m)   Wt 290 lb (131.5 kg)   SpO2 98%   BMI 38.26 kg/m²   GENERAL: well developed, well nourished in no acute distress  HEEN Patient notes that his venous insufficiency is improved. Patient is wearing venous compression stockings. In addition to this patient was taken a diuretic. I will switch the patient from Dyazide to Lasix 20 mg orally every morning.   I will have a repeat

## 2020-11-02 RX ORDER — MELOXICAM 15 MG/1
TABLET ORAL
Qty: 30 TABLET | Refills: 11 | Status: SHIPPED | OUTPATIENT
Start: 2020-11-02 | End: 2021-12-03

## 2020-12-05 ENCOUNTER — LAB ENCOUNTER (OUTPATIENT)
Dept: LAB | Age: 70
End: 2020-12-05
Attending: INTERNAL MEDICINE
Payer: MEDICARE

## 2020-12-05 DIAGNOSIS — I10 ESSENTIAL HYPERTENSION: ICD-10-CM

## 2020-12-05 DIAGNOSIS — E87.5 HYPERKALEMIA: ICD-10-CM

## 2020-12-05 DIAGNOSIS — E83.52 HYPERCALCEMIA: ICD-10-CM

## 2020-12-05 PROCEDURE — 36415 COLL VENOUS BLD VENIPUNCTURE: CPT

## 2020-12-05 PROCEDURE — 80048 BASIC METABOLIC PNL TOTAL CA: CPT

## 2020-12-06 ENCOUNTER — TELEPHONE (OUTPATIENT)
Dept: INTERNAL MEDICINE CLINIC | Facility: CLINIC | Age: 70
End: 2020-12-06

## 2020-12-06 DIAGNOSIS — I10 HYPERTENSION, UNSPECIFIED TYPE: Primary | ICD-10-CM

## 2020-12-06 DIAGNOSIS — E78.00 HYPERCHOLESTEROLEMIA: ICD-10-CM

## 2020-12-07 NOTE — TELEPHONE ENCOUNTER
Spoke to pt and relayed MD message. Pt verbalized understanding. Pt prefers to call back to schedule lab appointment.

## 2020-12-07 NOTE — TELEPHONE ENCOUNTER
Please call pt and notify pt that his repeat BMP has turned out well. His K+ and Calcium have turned well/better. Pt is to CPM.  I will place orders in the system for pt to have done prior to his next visit with me. I will route this to nursing.   Thank

## 2021-01-01 ENCOUNTER — EXTERNAL RECORD (OUTPATIENT)
Dept: HEALTH INFORMATION MANAGEMENT | Facility: OTHER | Age: 71
End: 2021-01-01

## 2021-01-16 ENCOUNTER — LAB ENCOUNTER (OUTPATIENT)
Dept: LAB | Age: 71
End: 2021-01-16
Attending: INTERNAL MEDICINE
Payer: MEDICARE

## 2021-01-16 DIAGNOSIS — I10 HYPERTENSION, UNSPECIFIED TYPE: ICD-10-CM

## 2021-01-16 DIAGNOSIS — E78.00 HYPERCHOLESTEROLEMIA: ICD-10-CM

## 2021-01-16 LAB
ALT SERPL-CCNC: 43 U/L
ANION GAP SERPL CALC-SCNC: 2 MMOL/L (ref 0–18)
AST SERPL-CCNC: 28 U/L (ref 15–37)
BUN BLD-MCNC: 20 MG/DL (ref 7–18)
BUN/CREAT SERPL: 19.8 (ref 10–20)
CALCIUM BLD-MCNC: 9.9 MG/DL (ref 8.5–10.1)
CHLORIDE SERPL-SCNC: 111 MMOL/L (ref 98–112)
CHOLEST SMN-MCNC: 151 MG/DL (ref ?–200)
CO2 SERPL-SCNC: 28 MMOL/L (ref 21–32)
CREAT BLD-MCNC: 1.01 MG/DL
GLUCOSE BLD-MCNC: 97 MG/DL (ref 70–99)
HDLC SERPL-MCNC: 56 MG/DL (ref 40–59)
LDLC SERPL CALC-MCNC: 79 MG/DL (ref ?–100)
NONHDLC SERPL-MCNC: 95 MG/DL (ref ?–130)
OSMOLALITY SERPL CALC.SUM OF ELEC: 295 MOSM/KG (ref 275–295)
PATIENT FASTING Y/N/NP: YES
PATIENT FASTING Y/N/NP: YES
POTASSIUM SERPL-SCNC: 4.2 MMOL/L (ref 3.5–5.1)
SODIUM SERPL-SCNC: 141 MMOL/L (ref 136–145)
TRIGL SERPL-MCNC: 81 MG/DL (ref 30–149)
VLDLC SERPL CALC-MCNC: 16 MG/DL (ref 0–30)

## 2021-01-16 PROCEDURE — 36415 COLL VENOUS BLD VENIPUNCTURE: CPT

## 2021-01-16 PROCEDURE — 80048 BASIC METABOLIC PNL TOTAL CA: CPT

## 2021-01-16 PROCEDURE — 80061 LIPID PANEL: CPT

## 2021-01-16 PROCEDURE — 84450 TRANSFERASE (AST) (SGOT): CPT

## 2021-01-16 PROCEDURE — 84460 ALANINE AMINO (ALT) (SGPT): CPT

## 2021-01-25 ENCOUNTER — OFFICE VISIT (OUTPATIENT)
Dept: INTERNAL MEDICINE CLINIC | Facility: CLINIC | Age: 71
End: 2021-01-25
Payer: COMMERCIAL

## 2021-01-25 VITALS
SYSTOLIC BLOOD PRESSURE: 136 MMHG | OXYGEN SATURATION: 97 % | TEMPERATURE: 99 F | HEIGHT: 73 IN | HEART RATE: 56 BPM | DIASTOLIC BLOOD PRESSURE: 80 MMHG | BODY MASS INDEX: 39.49 KG/M2 | WEIGHT: 298 LBS

## 2021-01-25 DIAGNOSIS — R00.1 BRADYCARDIA: ICD-10-CM

## 2021-01-25 DIAGNOSIS — M15.9 PRIMARY OSTEOARTHRITIS INVOLVING MULTIPLE JOINTS: ICD-10-CM

## 2021-01-25 DIAGNOSIS — Z00.00 ANNUAL PHYSICAL EXAM: ICD-10-CM

## 2021-01-25 DIAGNOSIS — C61 PROSTATE CANCER (HCC): ICD-10-CM

## 2021-01-25 DIAGNOSIS — Z96.651 HISTORY OF TOTAL KNEE REPLACEMENT, RIGHT: ICD-10-CM

## 2021-01-25 DIAGNOSIS — R53.83 FATIGUE, UNSPECIFIED TYPE: ICD-10-CM

## 2021-01-25 DIAGNOSIS — I87.2 VENOUS INSUFFICIENCY: ICD-10-CM

## 2021-01-25 DIAGNOSIS — E78.00 HYPERCHOLESTEROLEMIA: ICD-10-CM

## 2021-01-25 DIAGNOSIS — I10 ESSENTIAL HYPERTENSION: Primary | ICD-10-CM

## 2021-01-25 DIAGNOSIS — I67.1 CEREBRAL ANEURYSM: ICD-10-CM

## 2021-01-25 PROCEDURE — 3079F DIAST BP 80-89 MM HG: CPT | Performed by: INTERNAL MEDICINE

## 2021-01-25 PROCEDURE — 3075F SYST BP GE 130 - 139MM HG: CPT | Performed by: INTERNAL MEDICINE

## 2021-01-25 PROCEDURE — 3008F BODY MASS INDEX DOCD: CPT | Performed by: INTERNAL MEDICINE

## 2021-01-25 PROCEDURE — 99214 OFFICE O/P EST MOD 30 MIN: CPT | Performed by: INTERNAL MEDICINE

## 2021-01-25 NOTE — PATIENT INSTRUCTIONS
1.  Patient is to continue his current diet, medication and activity. 2.  I will see the patient back in July, 5 to 6 months from now, with blood tests, urinalysis and EKG for his annual physical examination.   3.  I will see the patient back sooner as [place]

## 2021-01-25 NOTE — PROGRESS NOTES
Andrae Miller is a 72year old male. Patient presents with:  Checkup  Hypertension  Bradycardia    HPI:   Patient presents with:  Checkup  Hypertension  Bradycardia    Patient feels well.   He still notes he is has difficulty with his right knee that und Patient Position: Sitting, Cuff Size: large)   Pulse 56   Temp 98.6 °F (37 °C) (Oral)   Ht 6' 1\" (1.854 m)   Wt 298 lb (135.2 kg)   SpO2 97%   BMI 39.32 kg/m²   GENERAL: well developed, well nourished in no acute distress  HEENT: normal oropharynx, normal some swelling which comes and goes in his right knee. I have advised him to follow-up with his orthopedic doctor, Dr. Lele Garnica. 7. Cerebral aneurysm  Stable. CPM.      The patient indicates understanding of these issues and agrees to the plan.   The pa

## 2021-03-04 DIAGNOSIS — Z23 NEED FOR VACCINATION: ICD-10-CM

## 2021-03-15 DIAGNOSIS — Z23 NEED FOR VACCINATION: ICD-10-CM

## 2021-06-19 ENCOUNTER — LAB ENCOUNTER (OUTPATIENT)
Dept: LAB | Age: 71
End: 2021-06-19
Attending: INTERNAL MEDICINE
Payer: MEDICARE

## 2021-06-19 DIAGNOSIS — Z00.00 ANNUAL PHYSICAL EXAM: ICD-10-CM

## 2021-06-19 DIAGNOSIS — E78.00 HYPERCHOLESTEROLEMIA: ICD-10-CM

## 2021-06-19 DIAGNOSIS — C61 PROSTATE CANCER (HCC): ICD-10-CM

## 2021-06-19 DIAGNOSIS — R53.83 FATIGUE, UNSPECIFIED TYPE: ICD-10-CM

## 2021-06-19 PROCEDURE — 84443 ASSAY THYROID STIM HORMONE: CPT

## 2021-06-19 PROCEDURE — 81003 URINALYSIS AUTO W/O SCOPE: CPT | Performed by: INTERNAL MEDICINE

## 2021-06-19 PROCEDURE — 85025 COMPLETE CBC W/AUTO DIFF WBC: CPT

## 2021-06-19 PROCEDURE — 80061 LIPID PANEL: CPT

## 2021-06-19 PROCEDURE — 36415 COLL VENOUS BLD VENIPUNCTURE: CPT

## 2021-06-19 PROCEDURE — 80053 COMPREHEN METABOLIC PANEL: CPT

## 2021-07-12 ENCOUNTER — TELEPHONE (OUTPATIENT)
Dept: INTERNAL MEDICINE CLINIC | Facility: CLINIC | Age: 71
End: 2021-07-12

## 2021-07-12 NOTE — TELEPHONE ENCOUNTER
Patient was called per request below. no answer. A message was left for patient to gabriella back. Patient missed his medicare annual appointment today and needs to reschedule. Dr Heena Matias next opening is 8/19/2021 for a sixty minute appointment time slot. Awaiting patient's call back at this time.

## 2021-07-12 NOTE — TELEPHONE ENCOUNTER
, patient missed scheduled appt today 7/12/21 at 10:30am for Annual Medicare Px. Please call patient to reschedule. Thank you.

## 2021-08-19 ENCOUNTER — OFFICE VISIT (OUTPATIENT)
Dept: INTERNAL MEDICINE CLINIC | Facility: CLINIC | Age: 71
End: 2021-08-19
Payer: COMMERCIAL

## 2021-08-19 VITALS
HEIGHT: 73 IN | BODY MASS INDEX: 37.91 KG/M2 | HEART RATE: 56 BPM | SYSTOLIC BLOOD PRESSURE: 130 MMHG | WEIGHT: 286 LBS | OXYGEN SATURATION: 98 % | TEMPERATURE: 98 F | DIASTOLIC BLOOD PRESSURE: 76 MMHG

## 2021-08-19 DIAGNOSIS — I67.1 CEREBRAL ANEURYSM: ICD-10-CM

## 2021-08-19 DIAGNOSIS — R94.31 ABNORMAL EKG: ICD-10-CM

## 2021-08-19 DIAGNOSIS — M15.9 PRIMARY OSTEOARTHRITIS INVOLVING MULTIPLE JOINTS: ICD-10-CM

## 2021-08-19 DIAGNOSIS — R35.0 BENIGN PROSTATIC HYPERPLASIA WITH URINARY FREQUENCY: ICD-10-CM

## 2021-08-19 DIAGNOSIS — E78.00 HYPERCHOLESTEROLEMIA: ICD-10-CM

## 2021-08-19 DIAGNOSIS — Z96.651 HISTORY OF TOTAL KNEE REPLACEMENT, RIGHT: ICD-10-CM

## 2021-08-19 DIAGNOSIS — R00.1 BRADYCARDIA: ICD-10-CM

## 2021-08-19 DIAGNOSIS — I10 ESSENTIAL HYPERTENSION: ICD-10-CM

## 2021-08-19 DIAGNOSIS — Z00.00 ANNUAL PHYSICAL EXAM: Primary | ICD-10-CM

## 2021-08-19 DIAGNOSIS — N40.1 BENIGN PROSTATIC HYPERPLASIA WITH URINARY FREQUENCY: ICD-10-CM

## 2021-08-19 DIAGNOSIS — I45.10 RIGHT BUNDLE BRANCH BLOCK: ICD-10-CM

## 2021-08-19 DIAGNOSIS — I87.2 VENOUS INSUFFICIENCY: ICD-10-CM

## 2021-08-19 LAB
OCCULT BLOOD, STOOL 1: NEGATIVE
PERFORMANCE MONITORS CORRECT (YES/NO): YES YES/NO

## 2021-08-19 PROCEDURE — 99397 PER PM REEVAL EST PAT 65+ YR: CPT | Performed by: INTERNAL MEDICINE

## 2021-08-19 PROCEDURE — 3075F SYST BP GE 130 - 139MM HG: CPT | Performed by: INTERNAL MEDICINE

## 2021-08-19 PROCEDURE — 96160 PT-FOCUSED HLTH RISK ASSMT: CPT | Performed by: INTERNAL MEDICINE

## 2021-08-19 PROCEDURE — 3078F DIAST BP <80 MM HG: CPT | Performed by: INTERNAL MEDICINE

## 2021-08-19 PROCEDURE — 93000 ELECTROCARDIOGRAM COMPLETE: CPT | Performed by: INTERNAL MEDICINE

## 2021-08-19 PROCEDURE — G0439 PPPS, SUBSEQ VISIT: HCPCS | Performed by: INTERNAL MEDICINE

## 2021-08-19 PROCEDURE — 3008F BODY MASS INDEX DOCD: CPT | Performed by: INTERNAL MEDICINE

## 2021-08-19 RX ORDER — TAMSULOSIN HYDROCHLORIDE 0.4 MG/1
0.4 CAPSULE ORAL DAILY
Qty: 30 CAPSULE | Refills: 5 | Status: SHIPPED | OUTPATIENT
Start: 2021-08-19 | End: 2021-09-18

## 2021-08-19 NOTE — PATIENT INSTRUCTIONS
1.  Patient is to continue his current diet, medication and activity. 2.  Patient has received his Covid vaccines. He will be due for a Covid booster in November. 3.  I will see the patient back in 5 or 6 months with blood tests as ordered.   4.  I will

## 2021-08-19 NOTE — PROGRESS NOTES
Germán Nowak is a 80year old male who presents for a complete physical exam.   HPI:   Mr. Aruna Gates is a 60-year-old white male who was seen by me on August 19, 2021 for his Medicare advantage annual physical examination.   At the time of ex HYPERLIPIDEMIA    • HYPERTENSION    • HYPERTENSION NOS 8/3/2007   • Mixed hyperlipidemia 8/3/2007   • OBESITY    • OBESITY NOS 8/3/2007   • Osteoarthritis    • Other abnormal glucose    • Other and unspecified hyperlipidemia    • Special screening for [facility] pink, sclerae are nonicteric  NECK: supple,no cervical or supraclavicular lymphadenopathy or palpable masses,no carotid bruits  CHEST: no chest tenderness  LUNGS: clear to auscultation  CARDIO: RRR, normal S1S2, no murmurs  GI:Abdomen is protuberant, BS ar hypertension  Patient's blood pressure is doing well.  CPM.  As above. BASIC METABOLIC PANEL (8); Future    3. Bradycardia  Patient had an EKG today which showed him to have a sinus bradycardia of about 55 bpm.  EKG also has a right bundle branch block.   P aspirin?: Yes    Have you had any immunizations at another office such as Influenza, Hepatitis B, Tetanus, or Pneumococcal?: Yes     Functional Ability     Bathing or Showering: Able without help    Toileting: Able without help    Dressing: Able without he víctor (or don't speak clearly): No People get annoyed because I misunderstand what they say: No   I misunderstand what others are saying and make inappropriate responses: No I avoid social activities because I cannot hear well and fear I will reply improp Prostate Cancer Screening      PSA  Annually PSA due on 06/19/2022  Update Health Maintenance if applicable   Immunizations      Influenza Orders placed or performed in visit on 01/09/09   • INFLUENZA VIRUS VACCINE, >=1YEARS OF AGE    Update Immunizatio LDL Cholesterol Calc (mg/dL)   Date Value   06/30/2012 64     LDL Cholesterol (mg/dL)   Date Value   06/19/2021 88     Calculated LDL (mg/dL)   Date Value   03/29/2014 79.0    No flowsheet data found. Dilated Eye exam  Annually No flowsheet data found.

## 2021-10-22 RX ORDER — LISINOPRIL 20 MG/1
20 TABLET ORAL DAILY
Qty: 90 TABLET | Refills: 3 | Status: SHIPPED | OUTPATIENT
Start: 2021-10-22

## 2021-10-28 NOTE — PATIENT INSTRUCTIONS
MEDICARE WELLNESS VISIT NOTE    HISTORY OF PRESENT ILLNESS:   Gail Cortez presents for her Subsequent Annual Medicare Wellness Visit.   She has no new current complaints or concerns.  She continues to follow with the hematology oncologist as well as the cardiologist.     Patient Care Team:  Lorenza Jennings MD as PCP - General (Family Practice)  MD Luis Seaman MD as Cardiologist (Cardiology)  Susu Galicia MD as Oncologist (Hematology & Oncology)        Patient Active Problem List   Diagnosis   • Malignant neoplasm of overlapping sites of left breast in female, estrogen receptor positive (CMS/HCC)   • Other diseases of lung, not elsewhere classified   • RISK OF OSTEOPENIA   • Other and unspecified angina pectoris   • Tobacco use disorder   •  hyperlipidemia       H 67  L 149  Tri 78   • CAD MILD by CT/CTA 2011   • Palpitations   • COPD, moderate (CMS/HCC)   • Multiple lung nodules   • Acute pain of both shoulders   • Complete tear of left rotator cuff   • S/P robotic right lower lobe basilar segmentectomy of lung with lymph node dissection   • Malignant neoplasm of lower lobe of right lung (CMS/HCC)         Past Medical History:   Diagnosis Date   • Arthritis 12/11/2015    Right shoulder   • COPD (chronic obstructive pulmonary disease) (CMS/HCC)     mild to moderate    • Drug induced neutropenia(288.03)    • Essential (primary) hypertension    • High cholesterol    • Hilar mass    • Lung nodules     per note 9/21/2011:  less than 5 mm   • Malignant neoplasm of breast (female), unspecified site 2007    stage IIA T2, N0, M0, is s/p left mastectomy in 2005   • Pap smear, as part of routine gynecological examination 10/11/2012    normal   • S/P robotic right lower lobe basilar segmentectomy of lung with lymph node dissection 4/28/2021         Past Surgical History:   Procedure Laterality Date   • Arthrotomy/explore/treat knee joint  01/01/2001    acl repair left knee   • Biopsy of  1.  Patient is to continue his current diet, medication and activity. 2.  I will increase the patient's lisinopril to 20 mg orally every morning.   3.  I will plan see the patient back in about 6 months with blood tests, urinalysis and EKG for his annual p breast, needle core  2007   • Breast surgery  2009    Left mastectomy   • Colonoscopy  2016    Dr. Robbie Sampson: polyp is not adenomatous but fragment of polypoid colonic mucosa containing a lymphoid follicle.   • Colonoscopy  2010    Dr. Sampson-done for rectal bleeding, no f/u found   • Ir lung biopsy  2021   • Lasik surgery  2000   • Ligatn/strip long or short saphen  1972    Vein Stripping   • Lung removal, partial Right 2021    S/P robotic right lower lobe basilar segmentectomy of lung with lymph node dissection   • Mastectomy partial  2007    with sentinal node bx   • Removal of tonsils,<12 y/o      Tonsillectomy         Social History     Tobacco Use   • Smoking status: Former Smoker     Packs/day: 2.00     Years: 20.00     Pack years: 40.00     Types: Cigarettes     Quit date: 1991     Years since quittin.8   • Smokeless tobacco: Never Used   Substance Use Topics   • Alcohol use: Yes     Comment: occasionally   • Drug use: No     Drug use:    Drug Use:    No              Family History   Problem Relation Age of Onset   • Stroke Mother    • Heart Father         MI   • Hypertension Father    • Cancer Brother 67        colon   • Cancer Brother         head , neck cancer   • Arthritis Sister 25        cervical cancer   • Substance abuse Son    • Hypertension Maternal Uncle    • Cancer Paternal Aunt         blood cancer   • Cancer Paternal Uncle         Blood cancer   • Heart disease Paternal Uncle    • Hypertension Paternal Uncle        Current Outpatient Medications   Medication Sig Dispense Refill   • atorvastatin (LIPITOR) 20 MG tablet TAKE 1 TABLET BY MOUTH  DAILY 90 tablet 1   • rOPINIRole (REQUIP) 0.25 MG tablet One tablet in afternoon daily as directed 90 tablet 1   • rOPINIRole (REQUIP) 0.5 MG tablet 2 tablets in the evening, 2 hours before bedtime 180 tablet 0   • furosemide (LASIX) 20 MG tablet TAKE 1 TABLET BY MOUTH  DAILY 90 tablet 0   •  umeclidinium-vilanterol (Anoro Ellipta) 62.5-25 MCG/INH inhaler Inhale 1 puff into the lungs daily. 180 each 0   • fluticasone (FLONASE) 50 MCG/ACT nasal spray Spray 2 sprays in each nostril daily. 16 g 6   • docusate sodium-sennosides (SENOKOT S) 50-8.6 MG per tablet Take 2 tablets by mouth 2 times daily. 20 tablet 0   • BIOTIN PO Take 10,000 mg by mouth daily.      • vitamin B-12 (CYANOCOBALAMIN) 1000 MCG tablet Take 1,000 mcg by mouth daily.     • Ascorbic Acid (VITAMIN C PO) Take 1,000 mg by mouth daily.      • albuterol (VENTOLIN HFA) 108 (90 BASE) MCG/ACT inhaler Inhale 2 puffs into the lungs every 4 hours as needed for Shortness of Breath. 1 Inhaler 12   • Cholecalciferol (VITAMIN D) 2000 UNITS tablet Take 2,000 Units by mouth daily.     • acetaminophen (TYLENOL 8 HOUR ARTHRITIS PAIN) 650 MG CR tablet Take 650 mg by mouth every 8 hours as needed for Pain.     • aspirin 81 MG tablet Take 81 mg by mouth nightly.      • Omega-3 Fatty Acids (Fish Oil) 1000 MG CAPSULE DELAYED RELEASE Take 1,000 mg by mouth daily.        No current facility-administered medications for this visit.        The following items on the Medicare Health Risk Assessment were found to be positive  1.) Do you have an Advance directive, living will, or power of  for health care document that contains your wishes for end of life care?: No     6 a.) How many servings of Fruits and Vegetables do you have each day ( 1 serving = 1 piece of fruit, 1/2 cup fruits or vegetables): 1 per day     6 b.) How many servings of High Fiber / Whole Grain Foods to you have each day ( 1 serving = 1 cup cold cereal, 1/2 cup cooked cereal, 1 slice bread): 1 per day         Vision and Hearing screens: Not performed    Advance Directive:   The patient has the following documents:  No Advance Directives on file. Patient offered documents.    Cognitive/Functional Status: no evidence of cognitive dysfunction by direct observation    Opioid Review: Gail is not  taking opioid medications.    Recent PHQ 2/9 Score:    PHQ 2:  Date Adult PHQ 2 Score Adult PHQ 2 Interpretation   10/21/2021 0 No further screening needed       PHQ 9:       DEPRESSION ASSESSMENT/PLAN:  Depression screening is negative no further plan needed. Depression Screening performed. Screening time with patient was 8 minutes.     Body mass index is 26.33 kg/m².    BMI ASSESSMENT/PLAN:  Patient BMI is within normal range.     Needed Screening/Treatment:   Colorectal cancer screening  and Hepatitis C  Needed follow up:  Continued current follow-up course with Hematology-Oncology    See orders.   See Patient Instructions section.   Return in about 1 year (around 10/28/2022) for Medicare Wellness Visit.

## 2021-11-19 RX ORDER — ATORVASTATIN CALCIUM 20 MG/1
TABLET, FILM COATED ORAL
Qty: 90 TABLET | Refills: 3 | Status: SHIPPED | OUTPATIENT
Start: 2021-11-19

## 2021-11-19 RX ORDER — FUROSEMIDE 20 MG/1
20 TABLET ORAL DAILY
Qty: 90 TABLET | Refills: 3 | Status: SHIPPED | OUTPATIENT
Start: 2021-11-19

## 2021-12-03 RX ORDER — MELOXICAM 15 MG/1
TABLET ORAL
Qty: 90 TABLET | Refills: 3 | Status: SHIPPED | OUTPATIENT
Start: 2021-12-03

## 2022-02-04 NOTE — PROGRESS NOTES
Germán Nowak is a 80year old male who presents for a complete physical exam.   HPI:   Mr. King erazo is a 61-year-old white male who was seen by me on January 7, 2019 for his Medicare annual physical examination.   Patient notes that he fe 80 MG OR TBEC 1 TABLET DAILY Disp:  Rfl:       Past Medical History:   Diagnosis Date   • Aneurysm of unspecified site [facility])    • Cataracts, both eyes    • Hip pain 1/28/2010   • HYPERLIPIDEMIA    • HYPERTENSION    • HYPERTENSION NOS 8/3/2007   • Mixed hyp No distress  SKIN: no rashes,no suspicious lesions  HEENT: atraumatic, normocephalic, normal oropharynx, ears appear normal, normal TM's  EYES:PERRLA, EOMI, conjunctivae pink, sclerae are nonicteric  NECK: supple,no cervical or supraclavicular lymphadenopathy will obtain an x-ray of his right knee. The patient will be referred to Dr. Momo Rojas for orthopedic evaluation. I will see the patient back in 3 months with a lipid panel, AST and ALT.   I have also given the patient a prescription for Zithromax for his bron which he feels is giving him much problem. I will obtain an x-ray of the patient's right knee. I will refer the patient see Dr. Sonam Lopez for evaluation of his right knee pain and probable arthritis.     - XR KNEE, COMPLETE (4 OR MORE VIEWS), RIGHT ([id number] Fall/Risk Scorin          Depression Screening (PHQ-2/PHQ-9): Over the LAST 2 WEEKS   Little interest or pleasure in doing things (over the last two weeks)?: Not at all    Feeling down, depressed, or hopeless (over the last two weeks)?: Not at all Correct    What year is it?: Correct    Recall \"Ball\": Correct    Recall \"Flag\": Correct    Recall \"Tree\": Correct        Romulo Kathleen's SCREENING SCHEDULE   Tests on this list are recommended by your physician but may not be covered, or covered Immunization Activity if applicable    Hepatitis B Orders placed or performed in visit on 08/10/10   • HEPATITIS B VACCINE, OVER 20   Orders placed or performed in visit on 03/10/10   • HEPATITIS B VACCINE, OVER 20   Orders placed or performed in visit on

## 2022-02-24 ENCOUNTER — TELEPHONE (OUTPATIENT)
Dept: INTERNAL MEDICINE CLINIC | Facility: CLINIC | Age: 72
End: 2022-02-24

## 2022-02-25 RX ORDER — TAMSULOSIN HYDROCHLORIDE 0.4 MG/1
CAPSULE ORAL
Qty: 90 CAPSULE | Refills: 3 | Status: SHIPPED | OUTPATIENT
Start: 2022-02-25

## 2022-06-14 ENCOUNTER — TELEPHONE (OUTPATIENT)
Dept: INTERNAL MEDICINE CLINIC | Facility: CLINIC | Age: 72
End: 2022-06-14

## 2022-06-14 NOTE — TELEPHONE ENCOUNTER
Spoke to patient and relayed MD message and instructions, patient verbalizes understanding and agrees with plan. Discussed that a metallic taste in the mouth is a common side effect but if he has any other side effects he should hold the medication and check in with us prior to continuing.

## 2022-06-14 NOTE — TELEPHONE ENCOUNTER
COVID triage:     Start of symptoms: Runny nose for 6 months. Chills started on 6/13/22.  6/14/22 +Covid test at home. Fever: 98.7 F  []  No fever  []  Temperature  [x]  Chills - last night   []  Night sweats     Cough:  [] Productive cough  [] Cough with exertion  [x] Dry cough     Breathing:  [] Wheezing  [] Pain with deep breathing  [x] SOB with exertion - resolves at rest  [] SOB at rest  [] Heavy breathing     GI Symptoms: decreased intake today  [] Diarrhea  [x] Nausea  [] Vomiting  [] Abdominal pain  [x] Lack of appetite     Other symptoms:  [] Sore throat  [] Difficulty swallowing  [x] Nasal drainage  [] Nasal congestion  [] PND  [] Sinus pressure  [] Chest congestion  [] Ear pain  [] Body aches  [] Loss of sense of smell   [] Loss of sense of taste  []Conjunctivitis  [x] Headache  [x] Fatigue  [] Weakness     [x]OTC Medications: Tylenol      [] Any recent travel? [] Any sick contacts? 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 Dr. Sherif Cordero, please advise on medication recommendation. Instructed patient to continue to monitor his symptoms and call with new or worsening symptoms. Instructed him to call 911 or go to ER should he develop chest pain, shortness of breath or difficulty breathing. He verbalized understanding. Explained that he should be home quarantined and isolated on Days 1-5. Explained that he can resume activities on Days 6-10 should he be fever free, have improvement in his symptoms and wear a well fitted mask at all times. Explained that an N95 or KN95 is recommended. Invited patient to call back with any questions or concerns. He verbalized understanding.

## 2022-06-14 NOTE — TELEPHONE ENCOUNTER
He is a candidate for paxlovid, sent to pharmacy. Normal kidney function. Should hold atorvastatin for the 5 days while on this medication.  PLease notify patient

## 2022-06-14 NOTE — TELEPHONE ENCOUNTER
Pt has a cough/headache/runny nose/chills  Took a home Covid test today & it was positive     [phone]

## 2022-07-02 ENCOUNTER — LAB ENCOUNTER (OUTPATIENT)
Dept: LAB | Age: 72
End: 2022-07-02
Attending: INTERNAL MEDICINE
Payer: MEDICARE

## 2022-07-02 DIAGNOSIS — E78.00 HYPERCHOLESTEROLEMIA: ICD-10-CM

## 2022-07-02 DIAGNOSIS — I10 ESSENTIAL HYPERTENSION: ICD-10-CM

## 2022-07-02 LAB
ALT SERPL-CCNC: 45 U/L
ANION GAP SERPL CALC-SCNC: 4 MMOL/L (ref 0–18)
AST SERPL-CCNC: 29 U/L (ref 15–37)
BUN BLD-MCNC: 27 MG/DL (ref 7–18)
BUN/CREAT SERPL: 28.1 (ref 10–20)
CALCIUM BLD-MCNC: 9.6 MG/DL (ref 8.5–10.1)
CHLORIDE SERPL-SCNC: 110 MMOL/L (ref 98–112)
CHOLEST SERPL-MCNC: 124 MG/DL (ref ?–200)
CO2 SERPL-SCNC: 29 MMOL/L (ref 21–32)
CREAT BLD-MCNC: 0.96 MG/DL
FASTING PATIENT LIPID ANSWER: YES
FASTING STATUS PATIENT QL REPORTED: YES
GLUCOSE BLD-MCNC: 95 MG/DL (ref 70–99)
HDLC SERPL-MCNC: 45 MG/DL (ref 40–59)
LDLC SERPL CALC-MCNC: 65 MG/DL (ref ?–100)
NONHDLC SERPL-MCNC: 79 MG/DL (ref ?–130)
OSMOLALITY SERPL CALC.SUM OF ELEC: 301 MOSM/KG (ref 275–295)
POTASSIUM SERPL-SCNC: 4.8 MMOL/L (ref 3.5–5.1)
SODIUM SERPL-SCNC: 143 MMOL/L (ref 136–145)
TRIGL SERPL-MCNC: 68 MG/DL (ref 30–149)
VLDLC SERPL CALC-MCNC: 10 MG/DL (ref 0–30)

## 2022-07-02 PROCEDURE — 36415 COLL VENOUS BLD VENIPUNCTURE: CPT

## 2022-07-02 PROCEDURE — 84460 ALANINE AMINO (ALT) (SGPT): CPT

## 2022-07-02 PROCEDURE — 80048 BASIC METABOLIC PNL TOTAL CA: CPT

## 2022-07-02 PROCEDURE — 80061 LIPID PANEL: CPT

## 2022-07-02 PROCEDURE — 84450 TRANSFERASE (AST) (SGOT): CPT

## 2022-08-01 ENCOUNTER — OFFICE VISIT (OUTPATIENT)
Dept: INTERNAL MEDICINE CLINIC | Facility: CLINIC | Age: 72
End: 2022-08-01
Payer: COMMERCIAL

## 2022-08-01 VITALS
HEIGHT: 73 IN | DIASTOLIC BLOOD PRESSURE: 60 MMHG | OXYGEN SATURATION: 97 % | WEIGHT: 275 LBS | HEART RATE: 40 BPM | SYSTOLIC BLOOD PRESSURE: 130 MMHG | BODY MASS INDEX: 36.45 KG/M2 | TEMPERATURE: 97 F

## 2022-08-01 DIAGNOSIS — E78.00 HYPERCHOLESTEROLEMIA: ICD-10-CM

## 2022-08-01 DIAGNOSIS — I67.1 CEREBRAL ANEURYSM: ICD-10-CM

## 2022-08-01 DIAGNOSIS — R00.1 BRADYCARDIA: ICD-10-CM

## 2022-08-01 DIAGNOSIS — I87.2 VENOUS INSUFFICIENCY: ICD-10-CM

## 2022-08-01 DIAGNOSIS — I45.10 RIGHT BUNDLE BRANCH BLOCK: ICD-10-CM

## 2022-08-01 DIAGNOSIS — Z12.5 PROSTATE CANCER SCREENING: ICD-10-CM

## 2022-08-01 DIAGNOSIS — R35.0 BENIGN PROSTATIC HYPERPLASIA WITH URINARY FREQUENCY: ICD-10-CM

## 2022-08-01 DIAGNOSIS — R94.31 ABNORMAL EKG: ICD-10-CM

## 2022-08-01 DIAGNOSIS — Z00.00 ANNUAL PHYSICAL EXAM: Primary | ICD-10-CM

## 2022-08-01 DIAGNOSIS — M15.9 PRIMARY OSTEOARTHRITIS INVOLVING MULTIPLE JOINTS: ICD-10-CM

## 2022-08-01 DIAGNOSIS — R53.83 FATIGUE, UNSPECIFIED TYPE: ICD-10-CM

## 2022-08-01 DIAGNOSIS — N40.1 BENIGN PROSTATIC HYPERPLASIA WITH URINARY FREQUENCY: ICD-10-CM

## 2022-08-01 DIAGNOSIS — I10 ESSENTIAL HYPERTENSION: ICD-10-CM

## 2022-08-01 DIAGNOSIS — Z96.651 HISTORY OF TOTAL KNEE REPLACEMENT, RIGHT: ICD-10-CM

## 2022-08-01 LAB
OCCULT BLOOD, STOOL 1: NEGATIVE
PERFORMANCE MONITORS CORRECT (YES/NO): YES YES/NO

## 2022-08-01 NOTE — PATIENT INSTRUCTIONS
1.  Patient is to continue his current diet, medication and activity. 2.  I have encouraged the patient to get his second COVID booster vaccine. 3.  I have recommended the patient should follow-up with Dr. Warner Harp regarding his sinus bradycardia. 4.  Patient to follow-up with Dr. Dylan Handley and obtain his screening colonoscopy. 5.  I will place orders in the system for the patient to have blood test which will include a CBC, CMP, TSH, PSA and urinalysis. 6.  I will plan to see the patient back in about 6 months.

## 2022-11-10 RX ORDER — LISINOPRIL 20 MG/1
TABLET ORAL
Qty: 90 TABLET | Refills: 3 | Status: SHIPPED | OUTPATIENT
Start: 2022-11-10

## 2022-11-27 RX ORDER — FUROSEMIDE 20 MG/1
TABLET ORAL
Qty: 90 TABLET | Refills: 3 | Status: SHIPPED | OUTPATIENT
Start: 2022-11-27

## 2022-12-03 ENCOUNTER — LAB ENCOUNTER (OUTPATIENT)
Dept: LAB | Age: 72
End: 2022-12-03
Attending: INTERNAL MEDICINE
Payer: MEDICARE

## 2022-12-03 ENCOUNTER — HOSPITAL ENCOUNTER (OUTPATIENT)
Dept: GENERAL RADIOLOGY | Age: 72
Discharge: HOME | End: 2022-12-03
Attending: INTERNAL MEDICINE
Payer: MEDICARE

## 2022-12-03 ENCOUNTER — NURSE ONLY (OUTPATIENT)
Dept: LAB | Age: 72
End: 2022-12-03
Attending: INTERNAL MEDICINE
Payer: MEDICARE

## 2022-12-03 DIAGNOSIS — Z00.00 ANNUAL PHYSICAL EXAM: ICD-10-CM

## 2022-12-03 DIAGNOSIS — R53.83 FATIGUE, UNSPECIFIED TYPE: ICD-10-CM

## 2022-12-03 DIAGNOSIS — Z01.818 PRE-OP TESTING: ICD-10-CM

## 2022-12-03 DIAGNOSIS — Z12.5 PROSTATE CANCER SCREENING: ICD-10-CM

## 2022-12-03 LAB
ALBUMIN SERPL-MCNC: 3.7 G/DL (ref 3.4–5)
ALBUMIN/GLOB SERPL: 1.4 {RATIO} (ref 1–2)
ALP LIVER SERPL-CCNC: 63 U/L
ALT SERPL-CCNC: 53 U/L
ANION GAP SERPL CALC-SCNC: 4 MMOL/L (ref 0–18)
AST SERPL-CCNC: 35 U/L (ref 15–37)
BASOPHILS # BLD AUTO: 0.05 X10(3) UL (ref 0–0.2)
BASOPHILS NFR BLD AUTO: 0.9 %
BILIRUB SERPL-MCNC: 0.7 MG/DL (ref 0.1–2)
BILIRUB UR QL: NEGATIVE
BUN BLD-MCNC: 25 MG/DL (ref 7–18)
BUN/CREAT SERPL: 26.3 (ref 10–20)
CALCIUM BLD-MCNC: 9.9 MG/DL (ref 8.5–10.1)
CHLORIDE SERPL-SCNC: 110 MMOL/L (ref 98–112)
CLARITY UR: CLEAR
CO2 SERPL-SCNC: 29 MMOL/L (ref 21–32)
COLOR UR: YELLOW
COMPLEXED PSA SERPL-MCNC: 3.85 NG/ML (ref ?–4)
CREAT BLD-MCNC: 0.95 MG/DL
DEPRECATED RDW RBC AUTO: 46.1 FL (ref 35.1–46.3)
EOSINOPHIL # BLD AUTO: 0.25 X10(3) UL (ref 0–0.7)
EOSINOPHIL NFR BLD AUTO: 4.4 %
ERYTHROCYTE [DISTWIDTH] IN BLOOD BY AUTOMATED COUNT: 13.4 % (ref 11–15)
FASTING STATUS PATIENT QL REPORTED: YES
GFR SERPLBLD BASED ON 1.73 SQ M-ARVRAT: 86 ML/MIN/1.73M2 (ref 60–?)
GLOBULIN PLAS-MCNC: 2.7 G/DL (ref 2.8–4.4)
GLUCOSE BLD-MCNC: 88 MG/DL (ref 70–99)
GLUCOSE UR-MCNC: NEGATIVE MG/DL
HCT VFR BLD AUTO: 45.6 %
HGB BLD-MCNC: 14.3 G/DL
IMM GRANULOCYTES # BLD AUTO: 0.01 X10(3) UL (ref 0–1)
IMM GRANULOCYTES NFR BLD: 0.2 %
KETONES UR-MCNC: NEGATIVE MG/DL
LEUKOCYTE ESTERASE UR QL STRIP.AUTO: NEGATIVE
LYMPHOCYTES # BLD AUTO: 1.42 X10(3) UL (ref 1–4)
LYMPHOCYTES NFR BLD AUTO: 24.8 %
MCH RBC QN AUTO: 29.3 PG (ref 26–34)
MCHC RBC AUTO-ENTMCNC: 31.4 G/DL (ref 31–37)
MCV RBC AUTO: 93.4 FL
MONOCYTES # BLD AUTO: 0.5 X10(3) UL (ref 0.1–1)
MONOCYTES NFR BLD AUTO: 8.7 %
MRSA DNA SPEC QL NAA+PROBE: NEGATIVE
NEUTROPHILS # BLD AUTO: 3.5 X10 (3) UL (ref 1.5–7.7)
NEUTROPHILS # BLD AUTO: 3.5 X10(3) UL (ref 1.5–7.7)
NEUTROPHILS NFR BLD AUTO: 61 %
NITRITE UR QL STRIP.AUTO: NEGATIVE
OSMOLALITY SERPL CALC.SUM OF ELEC: 300 MOSM/KG (ref 275–295)
PH UR: 6.5 [PH] (ref 5–8)
PLATELET # BLD AUTO: 163 10(3)UL (ref 150–450)
POTASSIUM SERPL-SCNC: 4.7 MMOL/L (ref 3.5–5.1)
PROT SERPL-MCNC: 6.4 G/DL (ref 6.4–8.2)
PROT UR-MCNC: NEGATIVE MG/DL
RBC # BLD AUTO: 4.88 X10(6)UL
SODIUM SERPL-SCNC: 143 MMOL/L (ref 136–145)
SP GR UR STRIP: 1.02 (ref 1–1.03)
TSI SER-ACNC: 2.25 MIU/ML (ref 0.36–3.74)
UROBILINOGEN UR STRIP-ACNC: 0.2
WBC # BLD AUTO: 5.7 X10(3) UL (ref 4–11)

## 2022-12-03 PROCEDURE — 87641 MR-STAPH DNA AMP PROBE: CPT

## 2022-12-03 PROCEDURE — 84443 ASSAY THYROID STIM HORMONE: CPT

## 2022-12-03 PROCEDURE — 71046 X-RAY EXAM CHEST 2 VIEWS: CPT | Performed by: INTERNAL MEDICINE

## 2022-12-03 PROCEDURE — 81015 MICROSCOPIC EXAM OF URINE: CPT | Performed by: INTERNAL MEDICINE

## 2022-12-03 PROCEDURE — 81001 URINALYSIS AUTO W/SCOPE: CPT | Performed by: INTERNAL MEDICINE

## 2022-12-03 PROCEDURE — 36415 COLL VENOUS BLD VENIPUNCTURE: CPT

## 2022-12-03 PROCEDURE — 85025 COMPLETE CBC W/AUTO DIFF WBC: CPT

## 2022-12-03 PROCEDURE — 80053 COMPREHEN METABOLIC PANEL: CPT

## 2022-12-04 LAB — SARS-COV-2 RNA RESP QL NAA+PROBE: NOT DETECTED

## 2022-12-05 ENCOUNTER — TELEPHONE (OUTPATIENT)
Dept: INTERNAL MEDICINE CLINIC | Facility: CLINIC | Age: 72
End: 2022-12-05

## 2022-12-05 NOTE — TELEPHONE ENCOUNTER
Can you please review lab results for DR. JOYCE patient so he can be notified thanks - to DR. GARDINER

## 2022-12-05 NOTE — TELEPHONE ENCOUNTER
Please notify the patient that I reviewed the blood test from 12/3. He was fasting as it did show some dehydration. But otherwise his kidney function and liver function thyroid, blood counts look good. Prostate level is normal, did bump a little bit from a year ago.     No new recommendations for now, okay to follow-up with Dr. Saintclair Lundborg as scheduled in February

## 2022-12-06 ENCOUNTER — APPOINTMENT (OUTPATIENT)
Dept: GENERAL RADIOLOGY | Facility: HOSPITAL | Age: 72
End: 2022-12-06
Attending: INTERNAL MEDICINE
Payer: MEDICARE

## 2022-12-06 ENCOUNTER — HOSPITAL ENCOUNTER (OUTPATIENT)
Dept: INTERVENTIONAL RADIOLOGY/VASCULAR | Facility: HOSPITAL | Age: 72
Discharge: HOME | End: 2022-12-06
Attending: INTERNAL MEDICINE | Admitting: INTERNAL MEDICINE
Payer: MEDICARE

## 2022-12-06 VITALS
TEMPERATURE: 96 F | WEIGHT: 267 LBS | OXYGEN SATURATION: 98 % | HEIGHT: 73 IN | BODY MASS INDEX: 35.39 KG/M2 | HEART RATE: 60 BPM | RESPIRATION RATE: 20 BRPM | DIASTOLIC BLOOD PRESSURE: 89 MMHG | SYSTOLIC BLOOD PRESSURE: 182 MMHG

## 2022-12-06 DIAGNOSIS — R00.1 SINUS BRADYCARDIA: ICD-10-CM

## 2022-12-06 DIAGNOSIS — Z01.818 PRE-OP TESTING: Primary | ICD-10-CM

## 2022-12-06 PROCEDURE — 02HK3JZ INSERTION OF PACEMAKER LEAD INTO RIGHT VENTRICLE, PERCUTANEOUS APPROACH: ICD-10-PCS | Performed by: INTERNAL MEDICINE

## 2022-12-06 PROCEDURE — 36415 COLL VENOUS BLD VENIPUNCTURE: CPT

## 2022-12-06 PROCEDURE — 0JH606Z INSERTION OF PACEMAKER, DUAL CHAMBER INTO CHEST SUBCUTANEOUS TISSUE AND FASCIA, OPEN APPROACH: ICD-10-PCS | Performed by: INTERNAL MEDICINE

## 2022-12-06 PROCEDURE — 99152 MOD SED SAME PHYS/QHP 5/>YRS: CPT

## 2022-12-06 PROCEDURE — 71045 X-RAY EXAM CHEST 1 VIEW: CPT | Performed by: INTERNAL MEDICINE

## 2022-12-06 PROCEDURE — 99153 MOD SED SAME PHYS/QHP EA: CPT

## 2022-12-06 PROCEDURE — 02H63JZ INSERTION OF PACEMAKER LEAD INTO RIGHT ATRIUM, PERCUTANEOUS APPROACH: ICD-10-PCS | Performed by: INTERNAL MEDICINE

## 2022-12-06 PROCEDURE — 33208 INSRT HEART PM ATRIAL & VENT: CPT

## 2022-12-06 RX ORDER — LIDOCAINE HYDROCHLORIDE AND EPINEPHRINE 10; 10 MG/ML; UG/ML
INJECTION, SOLUTION INFILTRATION; PERINEURAL
Status: COMPLETED
Start: 2022-12-06 | End: 2022-12-06

## 2022-12-06 RX ORDER — MIDAZOLAM HYDROCHLORIDE 1 MG/ML
INJECTION INTRAMUSCULAR; INTRAVENOUS
Status: COMPLETED
Start: 2022-12-06 | End: 2022-12-06

## 2022-12-06 RX ORDER — CEFAZOLIN SODIUM/WATER 2 G/20 ML
SYRINGE (ML) INTRAVENOUS
Status: COMPLETED
Start: 2022-12-06 | End: 2022-12-06

## 2022-12-06 RX ORDER — SODIUM CHLORIDE 9 MG/ML
INJECTION, SOLUTION INTRAVENOUS
Status: COMPLETED | OUTPATIENT
Start: 2022-12-06 | End: 2022-12-06

## 2022-12-06 RX ORDER — ONDANSETRON 2 MG/ML
4 INJECTION INTRAMUSCULAR; INTRAVENOUS EVERY 6 HOURS PRN
Status: DISCONTINUED | OUTPATIENT
Start: 2022-12-06 | End: 2022-12-06

## 2022-12-06 RX ADMIN — SODIUM CHLORIDE: 9 INJECTION, SOLUTION INTRAVENOUS at 08:30:00

## 2022-12-06 NOTE — PROCEDURES
OPERATION(S) PERFORMED:   1. Dual-chamber pacemaker implant   2. Chest fluoroscopy. : Carlota Bryant MD    INDICATION: SSS, symptomatic pauses 3-4 seconds and chronotropic incompetence, CLARK, no reversible cause    COMPLICATIONS: None   Moderate conscious sedation for this procedure was administered and personally monitored from 915 until 1000. ESTIMATED BLOOD LOSS: Minimal.    METHODS: The patient was brought to the outpatient cardiac telemetry unit in a fasting and nonsedated state after providing informed consent. IV sedation was administered during continuous ECG, pulse oximeter, and noninvasive hemodynamic monitoring. After administering 1% lidocaine for local anesthesia, an incision was made parallel to the left deltopectoral groove. The plane of the incision was extended to the prepectoral fascia. A pocket was formed. Access to the cephalic vein was achieved via cutdown and it was isolated with stay sutures. Venotomy was performed and a sheath was placed over a J wire which was advanced to the IVC. The RV lead was placed in the RV apex. Local electrograms and pacing thresholds were measured. The sheath was removed with a retained J wire in place. Another sheath was placed over this wire. In a similar manner, an atrial lead was positioned in the RA appendage. Local electrograms and pacing thresholds were measured. Pacing was performed at 10 v to rule out phrenic nerve stimulation. The pocket was irrigated with antibiotic solution. Bleeding was sought for until hemostasis was achieved. The leads were sutured to the pectoralis muscle over the suture collar. The cephalic vein was sutured proximally and distally with the stay sutures. The leads were connected to a pulse generator. They were coiled and placed into the pocket along with the pulse generator. The incision was closed in 2 layers using 0 and 4-0 Vicryl. Steri-Strips were applied followed by a sterile dressing.  The left arm was placed in a sling and the patient was transported to telemetry in stable condition. There were no apparent intraoperative complications. MEASURED DATA: RA and RV lead impedence, sensing and thresholds all stable. CONCLUSIONS:   1. Status post successful implant of a dual-chamber pacemaker with a active fixation right ventricular (RV) lead, active fixation RA lead: [place] [employer]   2. Adequate RA, RV pacing and sensing thresholds.      Ronal Almaraz, [facility]  Cardiac Electrophysiology  12/6/2022

## 2022-12-06 NOTE — DISCHARGE INSTRUCTIONS
[facility] Discharge Instructions - Pacemaker or ICD Placement     1. Your appointment for a wound check is on 12/15/22 with _Device Clinic RN____________ at ____2:30__. 2.  At that appointment, they will make an appointment with Mirella Romero and also an appointment with the [address] [place] [address] for 3 months after implantation of your device. 3.  Please call [phone] if any questions about the follow-up appointments. 4. Do not get the incision site wet. 5.You may shower in 5 days. Blot the incision area gently with a towel. .    6. For surgical site pain, you may take extra strength tylenol (500mg), 1 or 2 tabs/caps every 4-6 hours as needed. Do not exceed maximum of 6 tabs/caps in 24 hours. 7. No strenuous exercises until cleared by your physician. Do NOT lift anything greater than 5-8 pounds for 1 month. 8. You may NOT reach or stretch your LEFT arm for 1 month. Keep your elbow below the level of the shoulder and you may only raise your elbow to shoulder level. 9. Wear your sling for 24 hours, then when in bed at night for 4 weeks. 10. No repeated arm movements such as swimming for 3 months. 11. Carry your ID Card at all times. 12. Screening mammography SHOULD NOT BE DONE until 6 months after device implant. Please consult with your ordering physician if diagnostic mammography is needed prior to 6 months from device implant. 15. Inform your doctors, dentists, and emergency personnel that you have a implanted cardiac device. 14. Do not drive until cleared by your physician. .      15. No alcohol, working, or critical decision making today.      16. Oral antibiotics will need to be taken for 24 hours      Notify your doctor if:    You have shortness of breath or persistent cough  Chest pain  Persistent pain on the site   Fever (temperature greater than 101F) chills, infection (redness, swelling, or yellow drainage to the site)

## 2022-12-06 NOTE — IVS NOTE
DISCHARGE NOTE    1, PATIENT AWAKE AND ALERT X 4    2. GORDON, VSS, NO PONV, NO PAIN    3. INSTRUCTIONS GIVEN TO PATIENT AND FAMILY    4. IV ACCESS WAS REMOVED BEFORE DISCHARGE    5. DR. Flaquita Ann       SPOKE WITH PATIENT AND FAMILY POST PROCEDURE    6. PATIENT ABLE TO eat, DRINK, AMBULATE, VOID    7. PROCEDURAL SITE REMAINS DRY, INTACT WITH GOOD CIRCULATION,    MOVEMENT AND SENSATION    8. FOLLOW UP APPOINTMENT WITH Device clinic RN    9. PATIENT DISCHARGED VIA WHEEL CHAIR TO main entrance    10. NEW PRESCRIPTION: none    11.  [employer] rep gave instructions regarding remote check tomorrow morning

## 2022-12-06 NOTE — INTERVAL H&P NOTE
Pre-op Diagnosis: * No pre-op diagnosis entered *    The above referenced H&P was reviewed by Eddie Cuellar MD on 12/6/2022, the patient was examined and no significant changes have occurred in the patient's condition since the H&P was performed. I discussed with the patient and/or legal representative the potential benefits, risks and side effects of this procedure; the likelihood of the patient achieving goals; and potential problems that might occur during recuperation. I discussed reasonable alternatives to the procedure, including risks, benefits and side effects related to the alternatives and risks related to not receiving this procedure. We will proceed with procedure as planned.

## 2022-12-15 RX ORDER — MELOXICAM 15 MG/1
15 TABLET ORAL DAILY
Qty: 90 TABLET | Refills: 3 | Status: SHIPPED | OUTPATIENT
Start: 2022-12-15

## 2022-12-16 RX ORDER — ATORVASTATIN CALCIUM 20 MG/1
20 TABLET, FILM COATED ORAL NIGHTLY
Qty: 90 TABLET | Refills: 3 | Status: SHIPPED | OUTPATIENT
Start: 2022-12-16

## 2023-02-13 ENCOUNTER — OFFICE VISIT (OUTPATIENT)
Dept: INTERNAL MEDICINE CLINIC | Facility: CLINIC | Age: 73
End: 2023-02-13

## 2023-02-13 VITALS
HEIGHT: 73 IN | SYSTOLIC BLOOD PRESSURE: 148 MMHG | DIASTOLIC BLOOD PRESSURE: 80 MMHG | OXYGEN SATURATION: 98 % | HEART RATE: 60 BPM | BODY MASS INDEX: 35.78 KG/M2 | WEIGHT: 270 LBS | TEMPERATURE: 98 F

## 2023-02-13 DIAGNOSIS — N40.1 BENIGN PROSTATIC HYPERPLASIA WITH URINARY FREQUENCY: ICD-10-CM

## 2023-02-13 DIAGNOSIS — I67.1 CEREBRAL ANEURYSM: ICD-10-CM

## 2023-02-13 DIAGNOSIS — I87.2 VENOUS INSUFFICIENCY: ICD-10-CM

## 2023-02-13 DIAGNOSIS — Z95.0 PACEMAKER: ICD-10-CM

## 2023-02-13 DIAGNOSIS — M15.9 PRIMARY OSTEOARTHRITIS INVOLVING MULTIPLE JOINTS: ICD-10-CM

## 2023-02-13 DIAGNOSIS — R00.1 BRADYCARDIA: ICD-10-CM

## 2023-02-13 DIAGNOSIS — Z00.00 ANNUAL PHYSICAL EXAM: ICD-10-CM

## 2023-02-13 DIAGNOSIS — E78.00 HYPERCHOLESTEROLEMIA: ICD-10-CM

## 2023-02-13 DIAGNOSIS — R53.83 FATIGUE, UNSPECIFIED TYPE: ICD-10-CM

## 2023-02-13 DIAGNOSIS — R35.0 BENIGN PROSTATIC HYPERPLASIA WITH URINARY FREQUENCY: ICD-10-CM

## 2023-02-13 DIAGNOSIS — Z96.651 HISTORY OF TOTAL KNEE REPLACEMENT, RIGHT: ICD-10-CM

## 2023-02-13 DIAGNOSIS — I10 ESSENTIAL HYPERTENSION: Primary | ICD-10-CM

## 2023-02-13 PROCEDURE — 3008F BODY MASS INDEX DOCD: CPT | Performed by: INTERNAL MEDICINE

## 2023-02-13 PROCEDURE — 3079F DIAST BP 80-89 MM HG: CPT | Performed by: INTERNAL MEDICINE

## 2023-02-13 PROCEDURE — 3077F SYST BP >= 140 MM HG: CPT | Performed by: INTERNAL MEDICINE

## 2023-02-13 PROCEDURE — 99214 OFFICE O/P EST MOD 30 MIN: CPT | Performed by: INTERNAL MEDICINE

## 2023-02-13 PROCEDURE — 1126F AMNT PAIN NOTED NONE PRSNT: CPT | Performed by: INTERNAL MEDICINE

## 2023-02-13 NOTE — PATIENT INSTRUCTIONS
Patient is to continue his current diet, medication and activity. I will refer the patient see Dr. Jerrod Duran or one of his associates for a screening colonoscopy. I will plan to see the patient back in about 6 months with blood test, urinalysis and EKG for his annual physical examination. I will see the patient back sooner as necessary.

## 2023-06-28 ENCOUNTER — LAB ENCOUNTER (OUTPATIENT)
Dept: LAB | Age: 73
End: 2023-06-28
Attending: INTERNAL MEDICINE
Payer: MEDICARE

## 2023-06-28 DIAGNOSIS — I10 ESSENTIAL HYPERTENSION: ICD-10-CM

## 2023-06-28 DIAGNOSIS — E78.00 HYPERCHOLESTEROLEMIA: ICD-10-CM

## 2023-06-28 DIAGNOSIS — R53.83 FATIGUE, UNSPECIFIED TYPE: ICD-10-CM

## 2023-06-28 DIAGNOSIS — Z00.00 ANNUAL PHYSICAL EXAM: ICD-10-CM

## 2023-06-28 LAB
ALBUMIN SERPL-MCNC: 3.8 G/DL (ref 3.4–5)
ALBUMIN/GLOB SERPL: 1.2 {RATIO} (ref 1–2)
ALP LIVER SERPL-CCNC: 58 U/L
ALT SERPL-CCNC: 66 U/L
ANION GAP SERPL CALC-SCNC: 6 MMOL/L (ref 0–18)
AST SERPL-CCNC: 45 U/L (ref 15–37)
BASOPHILS # BLD AUTO: 0.04 X10(3) UL (ref 0–0.2)
BASOPHILS NFR BLD AUTO: 0.8 %
BILIRUB SERPL-MCNC: 0.6 MG/DL (ref 0.1–2)
BILIRUB UR QL: NEGATIVE
BUN BLD-MCNC: 26 MG/DL (ref 7–18)
BUN/CREAT SERPL: 26.3 (ref 10–20)
CALCIUM BLD-MCNC: 10 MG/DL (ref 8.5–10.1)
CHLORIDE SERPL-SCNC: 110 MMOL/L (ref 98–112)
CHOLEST SERPL-MCNC: 141 MG/DL (ref ?–200)
CLARITY UR: CLEAR
CO2 SERPL-SCNC: 26 MMOL/L (ref 21–32)
COLOR UR: COLORLESS
CREAT BLD-MCNC: 0.99 MG/DL
DEPRECATED RDW RBC AUTO: 43.1 FL (ref 35.1–46.3)
EOSINOPHIL # BLD AUTO: 0.23 X10(3) UL (ref 0–0.7)
EOSINOPHIL NFR BLD AUTO: 4.7 %
ERYTHROCYTE [DISTWIDTH] IN BLOOD BY AUTOMATED COUNT: 13 % (ref 11–15)
FASTING PATIENT LIPID ANSWER: YES
FASTING STATUS PATIENT QL REPORTED: YES
GFR SERPLBLD BASED ON 1.73 SQ M-ARVRAT: 81 ML/MIN/1.73M2 (ref 60–?)
GLOBULIN PLAS-MCNC: 3.1 G/DL (ref 2.8–4.4)
GLUCOSE BLD-MCNC: 98 MG/DL (ref 70–99)
GLUCOSE UR-MCNC: NORMAL MG/DL
HCT VFR BLD AUTO: 44.7 %
HDLC SERPL-MCNC: 51 MG/DL (ref 40–59)
HGB BLD-MCNC: 14.8 G/DL
HGB UR QL STRIP.AUTO: NEGATIVE
IMM GRANULOCYTES # BLD AUTO: 0.01 X10(3) UL (ref 0–1)
IMM GRANULOCYTES NFR BLD: 0.2 %
KETONES UR-MCNC: NEGATIVE MG/DL
LDLC SERPL CALC-MCNC: 76 MG/DL (ref ?–100)
LEUKOCYTE ESTERASE UR QL STRIP.AUTO: NEGATIVE
LYMPHOCYTES # BLD AUTO: 1.15 X10(3) UL (ref 1–4)
LYMPHOCYTES NFR BLD AUTO: 23.6 %
MCH RBC QN AUTO: 29.9 PG (ref 26–34)
MCHC RBC AUTO-ENTMCNC: 33.1 G/DL (ref 31–37)
MCV RBC AUTO: 90.3 FL
MONOCYTES # BLD AUTO: 0.46 X10(3) UL (ref 0.1–1)
MONOCYTES NFR BLD AUTO: 9.4 %
NEUTROPHILS # BLD AUTO: 2.99 X10 (3) UL (ref 1.5–7.7)
NEUTROPHILS # BLD AUTO: 2.99 X10(3) UL (ref 1.5–7.7)
NEUTROPHILS NFR BLD AUTO: 61.3 %
NITRITE UR QL STRIP.AUTO: NEGATIVE
NONHDLC SERPL-MCNC: 90 MG/DL (ref ?–130)
OSMOLALITY SERPL CALC.SUM OF ELEC: 299 MOSM/KG (ref 275–295)
PH UR: 5 [PH] (ref 5–8)
PLATELET # BLD AUTO: 158 10(3)UL (ref 150–450)
POTASSIUM SERPL-SCNC: 4.8 MMOL/L (ref 3.5–5.1)
PROT SERPL-MCNC: 6.9 G/DL (ref 6.4–8.2)
PROT UR-MCNC: NEGATIVE MG/DL
RBC # BLD AUTO: 4.95 X10(6)UL
SODIUM SERPL-SCNC: 142 MMOL/L (ref 136–145)
SP GR UR STRIP: 1.01 (ref 1–1.03)
TRIGL SERPL-MCNC: 69 MG/DL (ref 30–149)
TSI SER-ACNC: 2.28 MIU/ML (ref 0.36–3.74)
UROBILINOGEN UR STRIP-ACNC: NORMAL
VLDLC SERPL CALC-MCNC: 11 MG/DL (ref 0–30)
WBC # BLD AUTO: 4.9 X10(3) UL (ref 4–11)

## 2023-06-28 PROCEDURE — 80061 LIPID PANEL: CPT

## 2023-06-28 PROCEDURE — 36415 COLL VENOUS BLD VENIPUNCTURE: CPT

## 2023-06-28 PROCEDURE — 80053 COMPREHEN METABOLIC PANEL: CPT

## 2023-06-28 PROCEDURE — 84443 ASSAY THYROID STIM HORMONE: CPT

## 2023-06-28 PROCEDURE — 85025 COMPLETE CBC W/AUTO DIFF WBC: CPT

## 2023-07-31 ENCOUNTER — OFFICE VISIT (OUTPATIENT)
Dept: INTERNAL MEDICINE CLINIC | Facility: CLINIC | Age: 73
End: 2023-07-31

## 2023-07-31 VITALS
HEART RATE: 60 BPM | WEIGHT: 264 LBS | TEMPERATURE: 99 F | OXYGEN SATURATION: 98 % | SYSTOLIC BLOOD PRESSURE: 130 MMHG | DIASTOLIC BLOOD PRESSURE: 80 MMHG | HEIGHT: 70.9 IN | BODY MASS INDEX: 36.96 KG/M2

## 2023-07-31 DIAGNOSIS — R35.0 BENIGN PROSTATIC HYPERPLASIA WITH URINARY FREQUENCY: ICD-10-CM

## 2023-07-31 DIAGNOSIS — R79.89 ELEVATED LFTS: ICD-10-CM

## 2023-07-31 DIAGNOSIS — Z96.651 HISTORY OF TOTAL KNEE REPLACEMENT, RIGHT: ICD-10-CM

## 2023-07-31 DIAGNOSIS — N40.1 BENIGN PROSTATIC HYPERPLASIA WITH URINARY FREQUENCY: ICD-10-CM

## 2023-07-31 DIAGNOSIS — Z00.00 ANNUAL PHYSICAL EXAM: Primary | ICD-10-CM

## 2023-07-31 DIAGNOSIS — M15.9 PRIMARY OSTEOARTHRITIS INVOLVING MULTIPLE JOINTS: ICD-10-CM

## 2023-07-31 DIAGNOSIS — E78.00 HYPERCHOLESTEROLEMIA: ICD-10-CM

## 2023-07-31 DIAGNOSIS — I87.2 VENOUS INSUFFICIENCY: ICD-10-CM

## 2023-07-31 DIAGNOSIS — I10 ESSENTIAL HYPERTENSION: ICD-10-CM

## 2023-07-31 DIAGNOSIS — R53.83 FATIGUE, UNSPECIFIED TYPE: ICD-10-CM

## 2023-07-31 DIAGNOSIS — I67.1 CEREBRAL ANEURYSM: ICD-10-CM

## 2023-07-31 DIAGNOSIS — Z95.0 PACEMAKER: ICD-10-CM

## 2023-07-31 LAB
ATRIAL RATE: 60 BPM
OCCULT BLOOD, STOOL 1: NEGATIVE
P AXIS: 8 DEGREES
P-R INTERVAL: 234 MS
PERFORMANCE MONITORS CORRECT (YES/NO): YES YES/NO
Q-T INTERVAL: 444 MS
QRS DURATION: 182 MS
QTC CALCULATION (BEZET): 444 MS
R AXIS: 42 DEGREES
T AXIS: 24 DEGREES
VENTRICULAR RATE: 60 BPM

## 2023-07-31 PROCEDURE — 3079F DIAST BP 80-89 MM HG: CPT | Performed by: INTERNAL MEDICINE

## 2023-07-31 PROCEDURE — 1126F AMNT PAIN NOTED NONE PRSNT: CPT | Performed by: INTERNAL MEDICINE

## 2023-07-31 PROCEDURE — G0439 PPPS, SUBSEQ VISIT: HCPCS | Performed by: INTERNAL MEDICINE

## 2023-07-31 PROCEDURE — 93000 ELECTROCARDIOGRAM COMPLETE: CPT | Performed by: INTERNAL MEDICINE

## 2023-07-31 PROCEDURE — 3008F BODY MASS INDEX DOCD: CPT | Performed by: INTERNAL MEDICINE

## 2023-07-31 PROCEDURE — 96160 PT-FOCUSED HLTH RISK ASSMT: CPT | Performed by: INTERNAL MEDICINE

## 2023-07-31 PROCEDURE — 1160F RVW MEDS BY RX/DR IN RCRD: CPT | Performed by: INTERNAL MEDICINE

## 2023-07-31 PROCEDURE — 1159F MED LIST DOCD IN RCRD: CPT | Performed by: INTERNAL MEDICINE

## 2023-07-31 PROCEDURE — 82272 OCCULT BLD FECES 1-3 TESTS: CPT | Performed by: INTERNAL MEDICINE

## 2023-07-31 PROCEDURE — 99214 OFFICE O/P EST MOD 30 MIN: CPT | Performed by: INTERNAL MEDICINE

## 2023-07-31 PROCEDURE — 3075F SYST BP GE 130 - 139MM HG: CPT | Performed by: INTERNAL MEDICINE

## 2023-07-31 PROCEDURE — 1170F FXNL STATUS ASSESSED: CPT | Performed by: INTERNAL MEDICINE

## 2023-07-31 NOTE — PATIENT INSTRUCTIONS
Patient is to continue his current diet, medication and activity. I have encouraged the patient to obtain his flu vaccine and, if available, he is COVID-vaccine this autumn. I will plan to see the patient back in about 4 to 5 months with blood test which will include a BMP, lipid panel, AST and ALT. I will see the patient back sooner as necessary.

## 2023-11-22 RX ORDER — LISINOPRIL 20 MG/1
TABLET ORAL
Qty: 90 TABLET | Refills: 3 | Status: SHIPPED | OUTPATIENT
Start: 2023-11-22

## 2023-11-22 NOTE — TELEPHONE ENCOUNTER
Refill request is for a maintenance medication and has met the criteria specified in the Ambulatory Medication Refill Standing Order for eligibility, visits, laboratory, alerts and was sent to the requested pharmacy.     Requested Prescriptions     Signed Prescriptions Disp Refills    LISINOPRIL 20 MG Oral Tab 90 tablet 3     Sig: TAKE 1 TABLET(20 MG) BY MOUTH DAILY     Authorizing Provider: Ruth Samayoa     Ordering User: Laura Stewart

## 2023-12-14 RX ORDER — FUROSEMIDE 20 MG/1
20 TABLET ORAL DAILY
Qty: 90 TABLET | Refills: 3 | Status: SHIPPED | OUTPATIENT
Start: 2023-12-14 | End: 2023-12-18

## 2023-12-14 NOTE — TELEPHONE ENCOUNTER
Refill request is for a maintenance medication and has met the criteria specified in the Ambulatory Medication Refill Standing Order for eligibility, visits, laboratory, alerts and was sent to the requested pharmacy. Requested Prescriptions     Signed Prescriptions Disp Refills    furosemide 20 MG Oral Tab 90 tablet 3     Sig: Take 1 tablet (20 mg total) by mouth daily.      Authorizing Provider: Patty Michael     Ordering User: Hill Ryan

## 2023-12-18 ENCOUNTER — TELEPHONE (OUTPATIENT)
Dept: INTERNAL MEDICINE CLINIC | Facility: CLINIC | Age: 73
End: 2023-12-18

## 2023-12-18 LAB — AMB EXT COVID-19 RESULT: DETECTED

## 2023-12-18 RX ORDER — LISINOPRIL 20 MG/1
20 TABLET ORAL DAILY
Qty: 90 TABLET | Refills: 3 | Status: SHIPPED | OUTPATIENT
Start: 2023-12-18

## 2023-12-18 RX ORDER — FUROSEMIDE 20 MG/1
20 TABLET ORAL DAILY
Qty: 90 TABLET | Refills: 3 | Status: SHIPPED | OUTPATIENT
Start: 2023-12-18

## 2023-12-18 RX ORDER — ATORVASTATIN CALCIUM 20 MG/1
20 TABLET, FILM COATED ORAL NIGHTLY
Qty: 90 TABLET | Refills: 3 | Status: SHIPPED | OUTPATIENT
Start: 2023-12-18

## 2023-12-18 NOTE — TELEPHONE ENCOUNTER
COVID triage:    Start of symptoms:  12/18    Fever:  [x]  No fever  []  Temperature:   [x]  Chills - bad  []  Night sweats    Cough:  [] Productive cough  [] Cough with exertion  [x] Dry cough    Breathing:  [] Wheezing  [] Pain with deep breathing  [] SOB with exertion  [] SOB at rest  [] Heavy breathing  [] Chest discomfort with deep breathing or coughing    GI Symptoms:  [] Diarrhea  [x] Nausea  [] Vomiting  [] Abdominal pain  [x] Lack of appetite    Other symptoms:  [] Sore throat  [] Difficulty swallowing  [x] Nasal drainage  [] Nasal congestion  [] PND  [] Sinus pressure  [] Chest congestion  [] Head congestion  [] Facial pain   [] Ear pain  [] Body aches  [] Loss of sense of smell   [] Loss of sense of taste  []Conjunctivitis  [x] Headache  [x] Fatigue  [] Weakness    []OTC Medications:        [] Any recent travel? [x] Any sick contacts? 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 This is his 2nd time having it. He states the first time Paxlovid really helped. He is hoping to get the prescription again. Pharmacy is [facility] in [place].      To Dr. rAy Reed to please advise--

## 2023-12-18 NOTE — TELEPHONE ENCOUNTER
Please call patient  He tested positive for COVID with home test  Patient has chills, runny nose, cough, headache  Patient wife also had  Please call to advise  Tasked to nursing

## 2023-12-19 NOTE — TELEPHONE ENCOUNTER
Telephone call to patient and situation discussed. Patient's wife came down with COVID last week. Patient developed cough and head congestion associate with some chills today no fever. Patient tested positive for COVID. Patient had COVID before and did well with Paxlovid. I have sent a prescription for Paxlovid to the patient's pharmacy. Patient's GFR is 81. I have advised patient to hold his atorvastatin while he is taking his Paxlovid. He will take a 5-day course. Patient is to push fluids. Patient may use Zyrtec or Claritin for cough or head congestion. Patient may use Mucinex or Robitussin for cough and chest congestion. Patient can use Tylenol for aches and pains or fever. Patient is to give me a call back later this week to let me know how he is doing. If patient develops shortness of breath he is to go to the emergency room for evaluation.

## 2023-12-27 RX ORDER — MELOXICAM 15 MG/1
15 TABLET ORAL DAILY
Qty: 90 TABLET | Refills: 3 | Status: SHIPPED | OUTPATIENT
Start: 2023-12-27

## 2023-12-27 NOTE — TELEPHONE ENCOUNTER
Refill request is for a maintenance medication and has met the criteria specified in the Ambulatory Medication Refill Standing Order for eligibility, visits, laboratory, alerts and was sent to the requested pharmacy.     Requested Prescriptions     Signed Prescriptions Disp Refills    MELOXICAM 15 MG Oral Tab 90 tablet 3     Sig: TAKE 1 TABLET(15 MG) BY MOUTH DAILY     Authorizing Provider: Celso Wilkins     Ordering User: Rima Smith

## 2023-12-30 ENCOUNTER — LAB ENCOUNTER (OUTPATIENT)
Dept: LAB | Age: 73
End: 2023-12-30
Attending: INTERNAL MEDICINE
Payer: MEDICARE

## 2023-12-30 DIAGNOSIS — E78.00 HYPERCHOLESTEROLEMIA: ICD-10-CM

## 2023-12-30 DIAGNOSIS — I10 ESSENTIAL HYPERTENSION: ICD-10-CM

## 2023-12-30 DIAGNOSIS — R79.89 ELEVATED LFTS: ICD-10-CM

## 2023-12-30 DIAGNOSIS — R53.83 FATIGUE, UNSPECIFIED TYPE: ICD-10-CM

## 2023-12-30 LAB
ALT SERPL-CCNC: 77 U/L
ANION GAP SERPL CALC-SCNC: 2 MMOL/L (ref 0–18)
AST SERPL-CCNC: 44 U/L (ref ?–34)
BUN BLD-MCNC: 19 MG/DL (ref 9–23)
BUN/CREAT SERPL: 19.4 (ref 10–20)
CALCIUM BLD-MCNC: 10.3 MG/DL (ref 8.7–10.4)
CHLORIDE SERPL-SCNC: 108 MMOL/L (ref 98–112)
CHOLEST SERPL-MCNC: 171 MG/DL (ref ?–200)
CO2 SERPL-SCNC: 30 MMOL/L (ref 21–32)
CREAT BLD-MCNC: 0.98 MG/DL
EGFRCR SERPLBLD CKD-EPI 2021: 82 ML/MIN/1.73M2 (ref 60–?)
FASTING PATIENT LIPID ANSWER: YES
FASTING STATUS PATIENT QL REPORTED: YES
GLUCOSE BLD-MCNC: 102 MG/DL (ref 70–99)
HDLC SERPL-MCNC: 45 MG/DL (ref 40–59)
LDLC SERPL CALC-MCNC: 110 MG/DL (ref ?–100)
NONHDLC SERPL-MCNC: 126 MG/DL (ref ?–130)
OSMOLALITY SERPL CALC.SUM OF ELEC: 292 MOSM/KG (ref 275–295)
POTASSIUM SERPL-SCNC: 4.6 MMOL/L (ref 3.5–5.1)
SODIUM SERPL-SCNC: 140 MMOL/L (ref 136–145)
TRIGL SERPL-MCNC: 85 MG/DL (ref 30–149)
VLDLC SERPL CALC-MCNC: 14 MG/DL (ref 0–30)

## 2023-12-30 PROCEDURE — 84460 ALANINE AMINO (ALT) (SGPT): CPT

## 2023-12-30 PROCEDURE — 84450 TRANSFERASE (AST) (SGOT): CPT

## 2023-12-30 PROCEDURE — 36415 COLL VENOUS BLD VENIPUNCTURE: CPT

## 2023-12-30 PROCEDURE — 80048 BASIC METABOLIC PNL TOTAL CA: CPT

## 2023-12-30 PROCEDURE — 80061 LIPID PANEL: CPT

## 2024-01-03 ENCOUNTER — OFFICE VISIT (OUTPATIENT)
Dept: INTERNAL MEDICINE CLINIC | Facility: CLINIC | Age: 74
End: 2024-01-03

## 2024-01-03 VITALS
SYSTOLIC BLOOD PRESSURE: 136 MMHG | WEIGHT: 259 LBS | BODY MASS INDEX: 37.08 KG/M2 | DIASTOLIC BLOOD PRESSURE: 80 MMHG | HEIGHT: 70 IN | HEART RATE: 60 BPM | TEMPERATURE: 98 F

## 2024-01-03 DIAGNOSIS — Z96.651 HISTORY OF TOTAL KNEE REPLACEMENT, RIGHT: ICD-10-CM

## 2024-01-03 DIAGNOSIS — N40.1 BENIGN PROSTATIC HYPERPLASIA WITH URINARY FREQUENCY: ICD-10-CM

## 2024-01-03 DIAGNOSIS — R79.89 ELEVATED LFTS: ICD-10-CM

## 2024-01-03 DIAGNOSIS — M15.9 PRIMARY OSTEOARTHRITIS INVOLVING MULTIPLE JOINTS: ICD-10-CM

## 2024-01-03 DIAGNOSIS — R73.01 ABNORMAL FASTING GLUCOSE: ICD-10-CM

## 2024-01-03 DIAGNOSIS — Z95.0 PACEMAKER: ICD-10-CM

## 2024-01-03 DIAGNOSIS — Z12.5 PROSTATE CANCER SCREENING: ICD-10-CM

## 2024-01-03 DIAGNOSIS — E78.00 HYPERCHOLESTEROLEMIA: ICD-10-CM

## 2024-01-03 DIAGNOSIS — R53.83 FATIGUE, UNSPECIFIED TYPE: ICD-10-CM

## 2024-01-03 DIAGNOSIS — I67.1 CEREBRAL ANEURYSM: ICD-10-CM

## 2024-01-03 DIAGNOSIS — I10 ESSENTIAL HYPERTENSION: Primary | ICD-10-CM

## 2024-01-03 DIAGNOSIS — I87.2 VENOUS INSUFFICIENCY: ICD-10-CM

## 2024-01-03 DIAGNOSIS — R35.0 BENIGN PROSTATIC HYPERPLASIA WITH URINARY FREQUENCY: ICD-10-CM

## 2024-01-03 DIAGNOSIS — Z00.00 ANNUAL PHYSICAL EXAM: ICD-10-CM

## 2024-01-03 PROCEDURE — 1160F RVW MEDS BY RX/DR IN RCRD: CPT | Performed by: INTERNAL MEDICINE

## 2024-01-03 PROCEDURE — 1126F AMNT PAIN NOTED NONE PRSNT: CPT | Performed by: INTERNAL MEDICINE

## 2024-01-03 PROCEDURE — 99214 OFFICE O/P EST MOD 30 MIN: CPT | Performed by: INTERNAL MEDICINE

## 2024-01-03 PROCEDURE — 3079F DIAST BP 80-89 MM HG: CPT | Performed by: INTERNAL MEDICINE

## 2024-01-03 PROCEDURE — 3075F SYST BP GE 130 - 139MM HG: CPT | Performed by: INTERNAL MEDICINE

## 2024-01-03 PROCEDURE — 1159F MED LIST DOCD IN RCRD: CPT | Performed by: INTERNAL MEDICINE

## 2024-01-03 PROCEDURE — 3008F BODY MASS INDEX DOCD: CPT | Performed by: INTERNAL MEDICINE

## 2024-01-03 NOTE — PROGRESS NOTES
Romulo Kathleen is a 73 year old male.  Chief Complaint   Patient presents with    Checkup     5 month      Hypertension    Pacemaker     HPI:     Chief Complaint   Patient presents with    Checkup     5 month      Hypertension    Pacemaker     Pt feels well.  Pt had a recent bout of Covid and pt got better with Paxlovid.  Pt got better in  1 day with Paxlovid.  Patient feels that Paxlovid was very helpful in treating his recent bout of COVID.  Pt was off of his Atorvastatin for 1-2 weeks when he ran out of his med and then was on Paxlovid.  Current Outpatient Medications   Medication Sig Dispense Refill    MELOXICAM 15 MG Oral Tab TAKE 1 TABLET(15 MG) BY MOUTH DAILY 90 tablet 3    atorvastatin 20 MG Oral Tab Take 1 tablet (20 mg total) by mouth nightly. 90 tablet 3    furosemide 20 MG Oral Tab Take 1 tablet (20 mg total) by mouth daily. 90 tablet 3    lisinopril 20 MG Oral Tab Take 1 tablet (20 mg total) by mouth daily. 90 tablet 3    aspirin 81 MG Oral Tab EC 1 TABLET DAILY        Past Medical History:   Diagnosis Date    Aneurysm of unspecified site (HCC)     Cataracts, both eyes     Hearing impairment     tinnitus    Hip pain 1/28/2010    HYPERLIPIDEMIA     HYPERTENSION     HYPERTENSION NOS 8/3/2007    Mixed hyperlipidemia 8/3/2007    OBESITY     OBESITY NOS 8/3/2007    Osteoarthritis     Other abnormal glucose     Other and unspecified hyperlipidemia     Special screening for malignant neoplasms, colon 8/3/2007      Social History:  Social History     Socioeconomic History    Marital status:    Tobacco Use    Smoking status: Never     Passive exposure: Past    Smokeless tobacco: Former   Vaping Use    Vaping Use: Never used   Substance and Sexual Activity    Alcohol use: Yes     Comment: 4 beers    Drug use: No   Social History Narrative    : 1977    Children: 2 sons    Exercise: ref for football    Employment: semi retired, 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[employer], 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[employer]     Caffeine intake: 20 oz,         REVIEW OF SYSTEMS:   GENERAL HEALTH: feels well otherwise  RESPIRATORY:No cough or SOB  CARDIOVASCULAR: No chest pain  GI: No abdominal pain, nausea, vomiting, diarrhea, or constipation  :No Urinary complaints  EXT:No complaints of pain or swelling in patient's legs    EXAM:   /80 (BP Location: Left arm, Patient Position: Sitting, Cuff Size: large)   Pulse 60   Temp 98.4 °F (36.9 °C) (Oral)   Ht 5' 10\" (1.778 m)   Wt 259 lb (117.5 kg)   BMI 37.16 kg/m²   GENERAL: well developed, well nourished in no acute distress  HEENT: normal oropharynx, normal TM's. Ears are normal. Eyes are normal  NECK: supple,no lymphadenopathy or masses, no bruits  CHEST:  well-developed male  LUNGS: clear to auscultation  CARDIO: RRR, normal S1S2, without murmur   GI:Protuberant, BS are present, no organomegaly or palpable masses  EXTREMITIES: no edema  NEURO: alert and oriented  ASSESSMENT AND PLAN:   1. Essential hypertension  Patient appears to be doing well at this time.  Patient's blood pressure is doing well.  Patient is to continue his current diet, medication and activity.  Patient has received his flu vaccine.  I did ask patient to check with his cardiologist, Dr. Mauro Bauer, about getting his COVID-vaccine.  I will see the patient back in about 4 months with blood test, urinalysis and EKG for his annual physical examination.  Blood test will include a CBC, CMP, hemoglobin A1c, lipid panel, TSH and PSA.  I will see the patient back sooner as necessary.  Patient had a recent bout of COVID for which she took Paxlovid which helped him recover quickly.    2. Pacemaker  Stable.  CPM.  Patient follows up with his cardiologist, Dr. Mauro Bauer.    3. Hypercholesterolemia  Patient's lipid panel has a cholesterol 171, triglycerides are 85, HDL cholesterol is 45 and LDL cholesterol is 110.  Patient's AST is 44 and ALT is 77.  Patient was off of his atorvastatin for about 2 weeks prior to the blood  test which may account for the slight elevation of the patient's LDL cholesterol.  Patient also had a recent bout of COVID which may account for the patient's elevation of his liver enzymes.  I will follow-up on both of these in 4 months when the patient returns for his annual physical examination with blood tests as noted above.    4. Primary osteoarthritis involving multiple joints  Stable.  CPM.    5. Venous insufficiency  Doing well.  CPM.  Patient has no sign of venous insufficiency at this time.    6. History of total knee replacement, right  Doing well.  CPM.    7. Cerebral aneurysm  Stable.  CPM.    8. Benign prostatic hyperplasia with urinary frequency  Stable.  CPM.    9. Fatigue, unspecified type  Doing well.  CPM.    10. Elevated LFTs  Patient's recent liver enzymes were slightly elevated.  Patient's AST was 44 and ALT was 77.  Patient has cut back on the amount of alcohol that he drinks.  He now drinks about 4 beers per week.  Patient notes that he had a recent bout of COVID prior to his blood test.  This may account for the elevation of liver enzymes.  I will see the patient back in 4 months with blood tests as noted above which will include a CMP which will check his liver enzymes as well as other blood test.      The patient indicates understanding of these issues and agrees to the plan.  The patient is asked to return in 4 months with blood tests and urinalysis for his annual physical examination as noted above.  I will hold off on an EKG as patient have an EKG with his cardiologist, Dr. Mauro Bauer..    Gil Zhu MD  1/3/2024  8:15 AM

## 2024-01-03 NOTE — PATIENT INSTRUCTIONS
Patient is to continue his current diet, medication and activity.  Patient received his flu vaccine.  Patient will check with his cardiologist, Dr. Bauer, about getting the COVID-vaccine.  I will plan to see the patient back in about 4 months with blood test and urinalysis for his annual physical examination.  I will defer the patient's EKG to the patient's cardiologist.  The blood test will include a CBC, CMP, hemoglobin A1c, lipid panel, TSH and PSA.  I will see the patient back sooner as necessary.

## 2024-04-30 ENCOUNTER — LAB ENCOUNTER (OUTPATIENT)
Dept: LAB | Age: 74
End: 2024-04-30
Attending: INTERNAL MEDICINE
Payer: MEDICARE

## 2024-04-30 DIAGNOSIS — R73.01 ABNORMAL FASTING GLUCOSE: ICD-10-CM

## 2024-04-30 DIAGNOSIS — R53.83 FATIGUE, UNSPECIFIED TYPE: ICD-10-CM

## 2024-04-30 DIAGNOSIS — Z00.00 ANNUAL PHYSICAL EXAM: ICD-10-CM

## 2024-04-30 DIAGNOSIS — Z12.5 PROSTATE CANCER SCREENING: ICD-10-CM

## 2024-04-30 DIAGNOSIS — I10 ESSENTIAL HYPERTENSION: ICD-10-CM

## 2024-04-30 DIAGNOSIS — E78.00 HYPERCHOLESTEROLEMIA: ICD-10-CM

## 2024-04-30 DIAGNOSIS — R79.89 ELEVATED LFTS: ICD-10-CM

## 2024-04-30 LAB
ALBUMIN SERPL-MCNC: 4.4 G/DL (ref 3.2–4.8)
ALBUMIN/GLOB SERPL: 1.9 {RATIO} (ref 1–2)
ALP LIVER SERPL-CCNC: 58 U/L
ALT SERPL-CCNC: 38 U/L
ANION GAP SERPL CALC-SCNC: 2 MMOL/L (ref 0–18)
AST SERPL-CCNC: 30 U/L (ref ?–34)
BASOPHILS # BLD AUTO: 0.04 X10(3) UL (ref 0–0.2)
BASOPHILS NFR BLD AUTO: 0.7 %
BILIRUB SERPL-MCNC: 0.4 MG/DL (ref 0.2–1.1)
BILIRUB UR QL: NEGATIVE
BUN BLD-MCNC: 28 MG/DL (ref 9–23)
BUN/CREAT SERPL: 30.8 (ref 10–20)
CALCIUM BLD-MCNC: 10.4 MG/DL (ref 8.7–10.4)
CHLORIDE SERPL-SCNC: 113 MMOL/L (ref 98–112)
CHOLEST SERPL-MCNC: 146 MG/DL (ref ?–200)
CLARITY UR: CLEAR
CO2 SERPL-SCNC: 28 MMOL/L (ref 21–32)
COLOR UR: YELLOW
COMPLEXED PSA SERPL-MCNC: 3.99 NG/ML (ref ?–4)
CREAT BLD-MCNC: 0.91 MG/DL
DEPRECATED RDW RBC AUTO: 45.1 FL (ref 35.1–46.3)
EGFRCR SERPLBLD CKD-EPI 2021: 90 ML/MIN/1.73M2 (ref 60–?)
EOSINOPHIL # BLD AUTO: 0.18 X10(3) UL (ref 0–0.7)
EOSINOPHIL NFR BLD AUTO: 3.4 %
ERYTHROCYTE [DISTWIDTH] IN BLOOD BY AUTOMATED COUNT: 13.4 % (ref 11–15)
EST. AVERAGE GLUCOSE BLD GHB EST-MCNC: 123 MG/DL (ref 68–126)
FASTING PATIENT LIPID ANSWER: YES
FASTING STATUS PATIENT QL REPORTED: YES
GLOBULIN PLAS-MCNC: 2.3 G/DL (ref 2.8–4.4)
GLUCOSE BLD-MCNC: 96 MG/DL (ref 70–99)
GLUCOSE UR-MCNC: NORMAL MG/DL
HBA1C MFR BLD: 5.9 % (ref ?–5.7)
HCT VFR BLD AUTO: 45.2 %
HDLC SERPL-MCNC: 51 MG/DL (ref 40–59)
HGB BLD-MCNC: 14.5 G/DL
HGB UR QL STRIP.AUTO: NEGATIVE
IMM GRANULOCYTES # BLD AUTO: 0 X10(3) UL (ref 0–1)
IMM GRANULOCYTES NFR BLD: 0 %
KETONES UR-MCNC: NEGATIVE MG/DL
LDLC SERPL CALC-MCNC: 83 MG/DL (ref ?–100)
LEUKOCYTE ESTERASE UR QL STRIP.AUTO: NEGATIVE
LYMPHOCYTES # BLD AUTO: 1.28 X10(3) UL (ref 1–4)
LYMPHOCYTES NFR BLD AUTO: 24 %
MCH RBC QN AUTO: 29.3 PG (ref 26–34)
MCHC RBC AUTO-ENTMCNC: 32.1 G/DL (ref 31–37)
MCV RBC AUTO: 91.3 FL
MONOCYTES # BLD AUTO: 0.57 X10(3) UL (ref 0.1–1)
MONOCYTES NFR BLD AUTO: 10.7 %
NEUTROPHILS # BLD AUTO: 3.27 X10 (3) UL (ref 1.5–7.7)
NEUTROPHILS # BLD AUTO: 3.27 X10(3) UL (ref 1.5–7.7)
NEUTROPHILS NFR BLD AUTO: 61.2 %
NITRITE UR QL STRIP.AUTO: NEGATIVE
NONHDLC SERPL-MCNC: 95 MG/DL (ref ?–130)
OSMOLALITY SERPL CALC.SUM OF ELEC: 301 MOSM/KG (ref 275–295)
PH UR: 5.5 [PH] (ref 5–8)
PLATELET # BLD AUTO: 178 10(3)UL (ref 150–450)
POTASSIUM SERPL-SCNC: 4.7 MMOL/L (ref 3.5–5.1)
PROT SERPL-MCNC: 6.7 G/DL (ref 5.7–8.2)
RBC # BLD AUTO: 4.95 X10(6)UL
SODIUM SERPL-SCNC: 143 MMOL/L (ref 136–145)
SP GR UR STRIP: >1.03 (ref 1–1.03)
TRIGL SERPL-MCNC: 56 MG/DL (ref 30–149)
TSI SER-ACNC: 3.16 MIU/ML (ref 0.55–4.78)
UROBILINOGEN UR STRIP-ACNC: NORMAL
VLDLC SERPL CALC-MCNC: 9 MG/DL (ref 0–30)
WBC # BLD AUTO: 5.3 X10(3) UL (ref 4–11)

## 2024-04-30 PROCEDURE — 83036 HEMOGLOBIN GLYCOSYLATED A1C: CPT

## 2024-04-30 PROCEDURE — 80061 LIPID PANEL: CPT

## 2024-04-30 PROCEDURE — 81003 URINALYSIS AUTO W/O SCOPE: CPT | Performed by: INTERNAL MEDICINE

## 2024-04-30 PROCEDURE — 36415 COLL VENOUS BLD VENIPUNCTURE: CPT

## 2024-04-30 PROCEDURE — 80053 COMPREHEN METABOLIC PANEL: CPT

## 2024-04-30 PROCEDURE — 85025 COMPLETE CBC W/AUTO DIFF WBC: CPT

## 2024-04-30 PROCEDURE — 84443 ASSAY THYROID STIM HORMONE: CPT

## 2024-05-06 ENCOUNTER — OFFICE VISIT (OUTPATIENT)
Dept: INTERNAL MEDICINE CLINIC | Facility: CLINIC | Age: 74
End: 2024-05-06

## 2024-05-06 VITALS
HEIGHT: 70 IN | SYSTOLIC BLOOD PRESSURE: 140 MMHG | BODY MASS INDEX: 37.62 KG/M2 | OXYGEN SATURATION: 95 % | HEART RATE: 60 BPM | WEIGHT: 262.81 LBS | DIASTOLIC BLOOD PRESSURE: 80 MMHG | TEMPERATURE: 98 F

## 2024-05-06 DIAGNOSIS — Z00.00 ANNUAL PHYSICAL EXAM: Primary | ICD-10-CM

## 2024-05-06 DIAGNOSIS — Z95.0 PACEMAKER: ICD-10-CM

## 2024-05-06 DIAGNOSIS — E78.00 HYPERCHOLESTEROLEMIA: ICD-10-CM

## 2024-05-06 DIAGNOSIS — Z96.651 HISTORY OF TOTAL KNEE REPLACEMENT, RIGHT: ICD-10-CM

## 2024-05-06 DIAGNOSIS — R79.89 ELEVATED LFTS: ICD-10-CM

## 2024-05-06 DIAGNOSIS — R53.83 FATIGUE, UNSPECIFIED TYPE: ICD-10-CM

## 2024-05-06 DIAGNOSIS — R73.01 ABNORMAL FASTING GLUCOSE: ICD-10-CM

## 2024-05-06 DIAGNOSIS — I10 ESSENTIAL HYPERTENSION: ICD-10-CM

## 2024-05-06 DIAGNOSIS — I87.2 VENOUS INSUFFICIENCY: ICD-10-CM

## 2024-05-06 DIAGNOSIS — M15.9 PRIMARY OSTEOARTHRITIS INVOLVING MULTIPLE JOINTS: ICD-10-CM

## 2024-05-06 DIAGNOSIS — N40.1 BENIGN PROSTATIC HYPERPLASIA WITH URINARY FREQUENCY: ICD-10-CM

## 2024-05-06 DIAGNOSIS — I67.1 CEREBRAL ANEURYSM (HCC): ICD-10-CM

## 2024-05-06 DIAGNOSIS — R35.0 BENIGN PROSTATIC HYPERPLASIA WITH URINARY FREQUENCY: ICD-10-CM

## 2024-05-06 LAB
ATRIAL RATE: 63 BPM
OCCULT BLOOD, STOOL 1: NEGATIVE
P AXIS: -8 DEGREES
P-R INTERVAL: 250 MS
PERFORMANCE MONITORS CORRECT (YES/NO): YES YES/NO
Q-T INTERVAL: 452 MS
QRS DURATION: 174 MS
QTC CALCULATION (BEZET): 462 MS
R AXIS: 32 DEGREES
T AXIS: 25 DEGREES
VENTRICULAR RATE: 63 BPM

## 2024-05-06 RX ORDER — METHOCARBAMOL 750 MG/1
750 TABLET, FILM COATED ORAL 4 TIMES DAILY PRN
Qty: 40 TABLET | Refills: 3 | Status: SHIPPED | OUTPATIENT
Start: 2024-05-06 | End: 2025-05-06

## 2024-05-06 NOTE — PATIENT INSTRUCTIONS
I have also encouraged the patient to get his flu vaccine and possibly his COVID-vaccine this axel patient is to continue his current diet, medication and activity.  Patient should consider getting the Shingrix vaccine which is a 2 shot vaccine for shingles which should be covered by his insurance.  He can get this vaccine at the pharmacy.  I recommend the patient get his flu vaccine and possibly his COVID-vaccine in the autumn, October or November.  Patient to return in about 6 months to see Dr. Young with a BMP, lipid panel, AST and ALT.

## 2024-05-06 NOTE — PROGRESS NOTES
Romulo Kathleen is a 73 year old male who was seen by me on May 6, 2024 for his Medicare advantage annual physical examination.  HPI:   Mr. Romulo Kathleen is a 73-year-old white male who was seen by me on May 6, 2024 for his Medicare advantage annual physical examination.  At the time of examination Mr. Kathleen was feeling well and had no complaints.  He does complain of some pain in his left posterior lateral knee which appears to be related to a possible tendon that he attributes to wearing golf shoes that do not fit properly.  Patient is still working 2 to 3 days/week at [employer].  Patient has a pacemaker in place which she feels is doing well.  Patient has no complaints of chest pain or shortness of breath.  Patient remains active and plays golf on a regular basis.  He feels that he is doing well.    Wt Readings from Last 6 Encounters:   05/06/24 262 lb 12.8 oz (119.2 kg)   01/03/24 259 lb (117.5 kg)   07/31/23 264 lb (119.7 kg)   02/13/23 270 lb (122.5 kg)   11/30/22 267 lb (121.1 kg)   08/01/22 275 lb (124.7 kg)     Body mass index is 37.71 kg/m².     Current Outpatient Medications   Medication Sig Dispense Refill    methocarbamol 750 MG Oral Tab Take 1 tablet (750 mg total) by mouth 4 (four) times daily as needed. 40 tablet 3    MELOXICAM 15 MG Oral Tab TAKE 1 TABLET(15 MG) BY MOUTH DAILY 90 tablet 3    atorvastatin 20 MG Oral Tab Take 1 tablet (20 mg total) by mouth nightly. 90 tablet 3    furosemide 20 MG Oral Tab Take 1 tablet (20 mg total) by mouth daily. 90 tablet 3    lisinopril 20 MG Oral Tab Take 1 tablet (20 mg total) by mouth daily. 90 tablet 3    aspirin 81 MG Oral Tab EC 1 TABLET DAILY        Past Medical History:    Aneurysm of unspecified site (HCC)    Cataracts, both eyes    Hearing impairment    tinnitus    Hip pain    HYPERLIPIDEMIA    HYPERTENSION    HYPERTENSION NOS    Mixed hyperlipidemia    OBESITY    OBESITY NOS    Osteoarthritis    Other abnormal glucose    Other  and unspecified hyperlipidemia    Special screening for malignant neoplasms, colon      Past Surgical History:   Procedure Laterality Date    Appendectomy      Cataract  may,2019    Knee arthroscopy Left     Knee replacement surgery      Nasal surg proc unlisted      Other surgical history  09/30/2007    coiling brain aneurysm/[place] [id number], repeat 8/09 two coils      Family History   Problem Relation Age of Onset    Lipids Father     Hypertension Father     Diabetes Father     Heart Disorder Mother         Pacemaker    Other (dementia) Mother     Diabetes Son     Other (Other) Brother         nephritis    Other (Other) Brother         BILAT TKR    Heart Disorder Brother       Social History:  Social History     Socioeconomic History    Marital status:    Tobacco Use    Smoking status: Never     Passive exposure: Past    Smokeless tobacco: Former   Vaping Use    Vaping status: Never Used   Substance and Sexual Activity    Alcohol use: Yes     Comment: 4 beers    Drug use: No   Social History Narrative    : 1977    Children: 2 sons    Exercise: ref for football    Employment: semi retired, 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[employer], 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[employer]    Caffeine intake: 20 oz,      Social Determinants of Health     Physical Activity: Inactive (4/26/2019)    Received from [facility]    Exercise Vital Sign     Days of Exercise per Week: 0 days     Minutes of Exercise per Session: 0 min           REVIEW OF SYSTEMS:   GENERAL: feels well   EYES:denies blurred vision or double vision  HEENT: denies nasal congestion, sinus pain or ST  LUNGS: denies shortness of breath or cough  CARDIOVASCULAR: denies chest pain or pressure or palpitations  GI: denies abdominal pain, N/V, diarrhea, constipation, hematochezia or melena  : No urinary complaints  NEURO: denies headaches or dizziness    EXAM:   /80 (BP Location: Left arm, Patient Position: Sitting, Cuff  Size: large)   Pulse 60   Temp 97.6 °F (36.4 °C)   Ht 5' 10\" (1.778 m)   Wt 262 lb 12.8 oz (119.2 kg)   SpO2 95%   BMI 37.71 kg/m²   GENERAL: well developed, well nourished,in no acute distress  SKIN: no rashes,no suspicious lesions  HEENT: atraumatic, normocephalic, normal oropharynx, ears appear normal, normal TM's  EYES:PERRLA, EOMI, conjunctivae pink, sclerae are nonicteric  NECK: supple,no cervical or supraclavicular lymphadenopathy or palpable masses,no carotid bruits  CHEST: no chest tenderness  LUNGS: clear to auscultation  CARDIO: RRR, normal S1S2, no murmurs  GI:Abdomen is protuberant, BS are present, no masses or organomegaly  :Normal male, No hernia noted  RECTAL:  Stool is brown and is negative for Occult blood.  Prostate is 2+ enlarged with no palpable nodules  MUSCULOSKELETAL: back is not tender.  No pain or swelling of legs  EXTREMITIES:No edema.  All peripheral pulses are intact.  NEURO:Alert and oriented, CN are intact, DTRs are 1+ Bilaterally with absent Achilles reflexes bilaterally.    Patient's EKG has a pacemaker rhythm.    Patient CBC is normal.  Patient's CMP has an FBS of 96.  Patient's hemoglobin A1c is 5.9.  The remainder of the patient's CMP is essentially normal.  Patient's globulin is slightly low at 2.3.  Patient's urinalysis has trace protein but is otherwise normal.  Patient's lipid panel is a cholesterol of 146, triglycerides of 56, HDL cholesterol is 51 and LDL cholesterol is 83.  Patient's AST is 30 and ALT is 38.  Patient's TSH is normal at 3.157.  Patient's PSA is normal at 3.99.    ASSESSMENT AND PLAN:   1. Annual physical exam  Patient appears to be doing well at this time.  Patient to continue his current diet, medication and activity.  I have encouraged the patient to consider getting his Shingrix vaccine at his pharmacy.,  In October or November.  Patient is to return in about 6 months with blood test which will include a BMP, lipid panel, AST and ALT.  Patient has  requested to see Dr. Young at that time.    - ELECTROCARDIOGRAM, COMPLETE  - BLD OCLT PROXIDASE ACTV QUAL FECES 1 SPEC    2. Essential hypertension  Patient's blood pressure is doing well.  CPM.  As above.    3. Pacemaker  Stable.  CPM.  Patient follows up with his cardiologist, Dr. Mauro Bauer.    4. Hypercholesterolemia  Doing well.  CPM.  Patient's recent lipid panel had a cholesterol 146, triglycerides were 56, HDL cholesterol is 51 and LDL cholesterol was 83.  Patient's AST is 30 and ALT is 38.  CPM.  Patient return in 6 months with blood test which will include a lipid panel, AST and ALT.    - Lipid Panel; Future  - AST (SGOT) [E]; Future  - ALT(SGPT) [E]; Future    5. Primary osteoarthritis involving multiple joints  Doing well.  CPM.    6. Venous insufficiency  Doing well.  CPM.  Patient does wear compression stockings which has been very beneficial for his venous insufficiency.    7. History of total knee replacement, right  Doing well.  CPM.    8. Cerebral aneurysm (HCC)  Stable.  CPM.    9. Benign prostatic hyperplasia with urinary frequency  Doing well.  CPM.    - Basic Metabolic Panel (8); Future    10. Fatigue, unspecified type  Doing well.  CPM.    - ELECTROCARDIOGRAM, COMPLETE    11. Abnormal fasting glucose  Doing well.  CPM.  Patient's recent FBS was 96.  His hemoglobin A1c was 5.9.  CPM.    - Basic Metabolic Panel (8); Future    12. Elevated LFTs  Patient's liver function studies are normal.  CPM.      Gil Zhu MD  5/6/2024  6:01 PM

## 2024-05-23 ENCOUNTER — TELEPHONE (OUTPATIENT)
Dept: INTERNAL MEDICINE CLINIC | Facility: CLINIC | Age: 74
End: 2024-05-23

## 2024-05-23 NOTE — TELEPHONE ENCOUNTER
Patient has developed pain behind his his knee.  He can straighten the knee but he has trouble bending the knee back.  Patient does not recall any injury.  He previously has seen  but he is no longer in patients that were.  I really recommended that patient see Dr. Mamadou Kirk.

## 2024-05-23 NOTE — TELEPHONE ENCOUNTER
To Dr JOYCE,    Please advise.    Called and spoke with pt who stated he had sudden onset of pain 7-8 (1-10) x 1 week to his Lt knee. Pain is to posterior and  medial aspect of knee. No injury. Pain is worse with going down stairs. Pt has tried elevating knee, applying ice and wearing a knee brace without significant relief noted. Pt not taking anything for pain relief thus far. Denied any swelling or redness to knee. Dr Ford is no longer in network and pt requesting a new Ortho. Referral. Pt advised if symptoms worsen to seek treatment at the urgent care.

## 2024-05-23 NOTE — TELEPHONE ENCOUNTER
Patient experiencing pain in the back of his left knee for the last week   Dr Ford, who patient has seen before,  is not in network with his insurance    Requests call back to advise who he should see    [phone]

## 2024-06-27 ENCOUNTER — OFFICE VISIT (OUTPATIENT)
Dept: ORTHOPEDICS CLINIC | Facility: CLINIC | Age: 74
End: 2024-06-27

## 2024-06-27 ENCOUNTER — HOSPITAL ENCOUNTER (OUTPATIENT)
Dept: GENERAL RADIOLOGY | Facility: HOSPITAL | Age: 74
Discharge: HOME | End: 2024-06-27
Attending: ORTHOPAEDIC SURGERY

## 2024-06-27 VITALS
WEIGHT: 256.38 LBS | BODY MASS INDEX: 34.72 KG/M2 | HEIGHT: 72 IN | DIASTOLIC BLOOD PRESSURE: 72 MMHG | SYSTOLIC BLOOD PRESSURE: 130 MMHG | HEART RATE: 59 BPM

## 2024-06-27 DIAGNOSIS — M17.12 PRIMARY OSTEOARTHRITIS OF LEFT KNEE: Primary | ICD-10-CM

## 2024-06-27 DIAGNOSIS — M25.569 KNEE PAIN, UNSPECIFIED CHRONICITY, UNSPECIFIED LATERALITY: ICD-10-CM

## 2024-06-27 PROCEDURE — 3008F BODY MASS INDEX DOCD: CPT | Performed by: ORTHOPAEDIC SURGERY

## 2024-06-27 PROCEDURE — 1160F RVW MEDS BY RX/DR IN RCRD: CPT | Performed by: ORTHOPAEDIC SURGERY

## 2024-06-27 PROCEDURE — 73564 X-RAY EXAM KNEE 4 OR MORE: CPT | Performed by: ORTHOPAEDIC SURGERY

## 2024-06-27 PROCEDURE — 3075F SYST BP GE 130 - 139MM HG: CPT | Performed by: ORTHOPAEDIC SURGERY

## 2024-06-27 PROCEDURE — 1125F AMNT PAIN NOTED PAIN PRSNT: CPT | Performed by: ORTHOPAEDIC SURGERY

## 2024-06-27 PROCEDURE — 1159F MED LIST DOCD IN RCRD: CPT | Performed by: ORTHOPAEDIC SURGERY

## 2024-06-27 PROCEDURE — 20610 DRAIN/INJ JOINT/BURSA W/O US: CPT | Performed by: ORTHOPAEDIC SURGERY

## 2024-06-27 PROCEDURE — 3078F DIAST BP <80 MM HG: CPT | Performed by: ORTHOPAEDIC SURGERY

## 2024-06-27 PROCEDURE — 99204 OFFICE O/P NEW MOD 45 MIN: CPT | Performed by: ORTHOPAEDIC SURGERY

## 2024-06-27 RX ORDER — TRIAMCINOLONE ACETONIDE 40 MG/ML
40 INJECTION, SUSPENSION INTRA-ARTICULAR; INTRAMUSCULAR ONCE
Status: COMPLETED | OUTPATIENT
Start: 2024-06-27 | End: 2024-06-27

## 2024-06-27 RX ADMIN — TRIAMCINOLONE ACETONIDE 40 MG: 40 INJECTION, SUSPENSION INTRA-ARTICULAR; INTRAMUSCULAR at 16:12:00

## 2024-06-27 NOTE — PROGRESS NOTES
NURSING INTAKE COMMENTS:   Chief Complaint   Patient presents with    Knee Pain     Consult- L knee - onset- 1.5 month ago- denies injury- rates pain 7-9/10 all the time - mostly behind the knee       HPI: This 73 year old male presents today with complaints of left knee pain.  At the end of May he developed some throbbing pain along the anterior and posterior knee after walking.  Feels pain in the posterior knee extending into the calf slightly.  Feels more discomfort when flexing.  He works part-time as an OR tech.  He plays golf occasionally.  He reports no swelling no mechanical clicking.  Pain is worse with weightbearing.  He has increased pain on the stairs.    Past Medical History:    Aneurysm of unspecified site (HCC)    Cataracts, both eyes    Hearing impairment    tinnitus    Hip pain    HYPERLIPIDEMIA    HYPERTENSION    HYPERTENSION NOS    Mixed hyperlipidemia    OBESITY    OBESITY NOS    Osteoarthritis    Other abnormal glucose    Other and unspecified hyperlipidemia    Special screening for malignant neoplasms, colon     Past Surgical History:   Procedure Laterality Date    Appendectomy      Cataract  may,2019    Knee arthroscopy Left     Knee replacement surgery      Nasal surg proc unlisted      Other surgical history  09/30/2007    coiling brain aneurysm/[place] [id number], repeat 8/09 two coils     Current Outpatient Medications   Medication Sig Dispense Refill    methocarbamol 750 MG Oral Tab Take 1 tablet (750 mg total) by mouth 4 (four) times daily as needed. 40 tablet 3    MELOXICAM 15 MG Oral Tab TAKE 1 TABLET(15 MG) BY MOUTH DAILY 90 tablet 3    atorvastatin 20 MG Oral Tab Take 1 tablet (20 mg total) by mouth nightly. 90 tablet 3    furosemide 20 MG Oral Tab Take 1 tablet (20 mg total) by mouth daily. 90 tablet 3    lisinopril 20 MG Oral Tab Take 1 tablet (20 mg total) by mouth daily. 90 tablet 3    aspirin 81 MG Oral Tab EC 1 TABLET DAILY       No Known Allergies  Family History   Problem  Relation Age of Onset    Lipids Father     Hypertension Father     Diabetes Father     Heart Disorder Mother         Pacemaker    Other (dementia) Mother     Diabetes Son     Other (Other) Brother         nephritis    Other (Other) Brother         BILAT TKR    Heart Disorder Brother        Social History     Occupational History    Not on file   Tobacco Use    Smoking status: Never     Passive exposure: Past    Smokeless tobacco: Former   Vaping Use    Vaping status: Never Used   Substance and Sexual Activity    Alcohol use: Yes     Comment: 4 beers    Drug use: No    Sexual activity: Not on file        Review of Systems:  GENERAL: denies fevers, chills, night sweats, fatigue, unintentional weight loss/gain  SKIN: denies skin lesions, open sores, rash  HEENT:denies recent vision change, new nasal congestion,hearing loss, tinnitus, sore throat, headaches  RESPIRATORY: denies new shortness of breath, cough, asthma, wheezing  CARDIOVASCULAR: denies chest pain, leg cramps with exertion, palpitations, leg swelling  GI: denies abdominal pain, nausea, vomiting, diarrhea, constipation, hematochezia, worsening heartburn or stomach ulcers  : denies dysuria, hematuria, incontinence, increased frequency, urgency, difficulty urinating  MUSCULOSKELETAL: denies musculoskeletal complaints other than in HPI  NEURO: denies numbness, tingling, weakness, balance issues, dizziness, memory loss  PSYCHIATRIC: denies Hx of depression, anxiety, other psychiatric disorders  HEMATOLOGIC: denies blood clots, anemia, blood clotting disorders, blood transfusion  ENDOCRINE: denies autoimmune disease, thyroid issues, or diabetes  ALLERGY: denies asthma, seasonal allergies    Physical Examination:    Ht 6' (1.829 m)   Wt 256 lb 6.4 oz (116.3 kg)   BMI 34.77 kg/m²   Constitutional: appears well hydrated, alert and responsive, no acute distress noted  Extremities: He walks with slight antalgia on the left.  Further exam left knee reveals a trace  effusion.  Is mildly tender at the posterior lateral joint line.  Rotation maneuvers are negative.  Knee is stable to varus valgus stress at 30 degrees and full extension.  Range of motion of the knee is from 5 to 110 degrees.  No pain with passive range of motion of the hip.  Lachman sign and posterior drawer negative.  Neurological: Light touch and pinprick sensation intact throughout the lower extremities.  Ankle dorsiflexion plantarflexion EHL knee extension and hip flexion strength are 5 out of 5 bilaterally.  No clonus.    Imaging:   X-rays of left knee show advanced lateral compartment degenerative changes with near complete joint space narrowing and osteophyte formation.    Labs:  Lab Results   Component Value Date    WBC 5.3 04/30/2024    HGB 14.5 04/30/2024    .0 04/30/2024      Lab Results   Component Value Date    GLU 96 04/30/2024    BUN 28 (H) 04/30/2024    CREATSERUM 0.91 04/30/2024    GFR >59 03/03/2011    GFRNAA 80 07/02/2022    GFRAA 92 07/02/2022        Assessment and Plan:  Diagnoses and all orders for this visit:    Primary osteoarthritis of left knee  -     arthrocentesis major joint  -     triamcinolone acetonide (Kenalog-40) 40 MG/ML injection 40 mg    Knee pain, unspecified chronicity, unspecified laterality  -     XR KNEE, COMPLETE (4 OR MORE VIEWS), LEFT (CPT=73564); Future        Assessment: Left knee osteoarthritis, primary, acute exacerbation of arthritis    Plan: I discussed operative and nonoperative treatment options.  I advised nonoperative care for now.  The left knee was aspirated and injected with 40 mg of Kenalog using a superolateral parapatellar approach.  Provided written instructions on home exercises for the knee.  Discussed activity modifications, oral anti-inflammatories, icing.  Consider a lateral  brace for golf.  Follow-up with me again in 6 to 8 weeks as needed.    Follow Up: Return in about 6 weeks (around 8/8/2024).    JANETH DAMON MD

## 2024-06-27 NOTE — PROGRESS NOTES
Per verbal order from Dr. Kirk draw up 3ml of 0.5% Marcaine & 2ml 1% lidocaine and 1ml of Kenalog 40 for cortisone injection to left knee. Rosalva ROMAN MA    Patient provided education handout for cortisone injection.

## 2024-07-28 ENCOUNTER — HOSPITAL ENCOUNTER (OUTPATIENT)
Age: 74
Discharge: HOME | End: 2024-07-28
Payer: MEDICARE

## 2024-07-28 VITALS
TEMPERATURE: 97 F | RESPIRATION RATE: 16 BRPM | OXYGEN SATURATION: 96 % | HEART RATE: 60 BPM | DIASTOLIC BLOOD PRESSURE: 78 MMHG | SYSTOLIC BLOOD PRESSURE: 152 MMHG

## 2024-07-28 DIAGNOSIS — R21 RASH AND NONSPECIFIC SKIN ERUPTION: Primary | ICD-10-CM

## 2024-07-28 PROCEDURE — 99204 OFFICE O/P NEW MOD 45 MIN: CPT

## 2024-07-28 PROCEDURE — 99213 OFFICE O/P EST LOW 20 MIN: CPT

## 2024-07-28 NOTE — ED PROVIDER NOTES
Patient Seen in: [facility]      History     Chief Complaint   Patient presents with    Rash     Stated Complaint: rash    Subjective:   HPI    This is a 74-year-old male with a history of hypertension hyperlipidemia obesity presenting with a rash.  Patient states for about 2 days he thinks that he got bit by an insect and now has a rash on the right forearm and feels like it is a little swollen.  Patient states initially it was a pimple but the pimple is now gone.  Denies fevers or chills numbness or tingling in the extremity.  Denies pus or drainage from the skin.  Has not taken any over-the-counter medications or applied any over-the-counter medications to the skin    Objective:   No pertinent past medical history.            No pertinent past surgical history.              No pertinent social history.            Review of Systems    Positive for stated Chief Complaint: Rash    Other systems are as noted in HPI.  Constitutional and vital signs reviewed.      All other systems reviewed and negative except as noted above.    Physical Exam     ED Triage Vitals [07/28/24 0813]   /78   Pulse 60   Resp 16   Temp 97.1 °F (36.2 °C)   Temp src Temporal   SpO2 96 %   O2 Device None (Room air)       Current Vitals:   Vital Signs  BP: 152/78  Pulse: 60  Resp: 16  Temp: 97.1 °F (36.2 °C)  Temp src: Temporal    Oxygen Therapy  SpO2: 96 %  O2 Device: None (Room air)            Physical Exam  Vitals and nursing note reviewed.   Constitutional:       Appearance: Normal appearance.   HENT:      Right Ear: External ear normal.      Left Ear: External ear normal.      Nose: Nose normal.      Mouth/Throat:      Mouth: Mucous membranes are moist.      Pharynx: Oropharynx is clear.   Eyes:      Conjunctiva/sclera: Conjunctivae normal.   Musculoskeletal:         General: Normal range of motion.      Cervical back: Normal range of motion.   Skin:     General: Skin is warm and dry.      Capillary Refill: Capillary  refill takes less than 2 seconds.      Findings: Rash present.          Neurological:      General: No focal deficit present.      Mental Status: He is alert and oriented to person, place, and time.             ED Course   Labs Reviewed - No data to display         MDM                              Medical Decision Making  74-year-old male well-appearing and nontoxic presenting with a rash on the right forearm DDx insect bite versus insect bite with local reaction versus contact dermatitis versus poison ivy or poison oak versus cellulitis.  No clinical indication for labs or imaging.  Physical exam is not consistent with cellulitis concern for contact dermatitis.  Patient will be prescribed high-dose hydrocortisone cream and discussed over-the-counter antihistamine calamine lotion and cool compresses discussed signs symptoms of infection and return [facility] and ER precautions.  Patient agreeable with the plan of care and acknowledges understanding discharge instructions.    Problems Addressed:  Rash and nonspecific skin eruption: acute illness or injury    Risk  OTC drugs.  Prescription drug management.        Disposition and Plan     Clinical Impression:  1. Rash and nonspecific skin eruption         Disposition:  Discharge  7/28/2024  8:19 am    Follow-up:  Ze Young MD  [address] [place] [id number]  [phone]    In 1 week            Medications Prescribed:  Discharge Medication List as of 7/28/2024  8:21 AM        START taking these medications    Details   hydrocortisone 2.5 % External Cream Apply 1 Application topically 2 (two) times daily., Normal, Disp-28 g, R-0

## 2024-07-28 NOTE — DISCHARGE INSTRUCTIONS
Start the hydrocortisone cream as prescribed recommend over-the-counter antihistamine such as 24-hour Zyrtec or Claritin to help with itchy skin you may apply calamine lotion and cool compress if you develop redness moving up the arm or toward the hand pus or drainage from the skin fevers or chills skin is really hot to touch this will be concerning for infection return to the [facility] or go to the nearest emergency department otherwise follow-up with your primary care provider

## 2024-10-09 RX ORDER — ATORVASTATIN CALCIUM 20 MG/1
20 TABLET, FILM COATED ORAL NIGHTLY
Qty: 90 TABLET | Refills: 3 | Status: CANCELLED | OUTPATIENT
Start: 2024-10-09

## 2024-11-18 ENCOUNTER — OFFICE VISIT (OUTPATIENT)
Dept: INTERNAL MEDICINE CLINIC | Facility: CLINIC | Age: 74
End: 2024-11-18

## 2024-11-18 VITALS
WEIGHT: 258 LBS | SYSTOLIC BLOOD PRESSURE: 144 MMHG | OXYGEN SATURATION: 96 % | HEIGHT: 72 IN | BODY MASS INDEX: 34.95 KG/M2 | DIASTOLIC BLOOD PRESSURE: 72 MMHG | TEMPERATURE: 98 F | HEART RATE: 81 BPM

## 2024-11-18 DIAGNOSIS — R35.0 BENIGN PROSTATIC HYPERPLASIA WITH URINARY FREQUENCY: ICD-10-CM

## 2024-11-18 DIAGNOSIS — Z12.5 PROSTATE CANCER SCREENING: ICD-10-CM

## 2024-11-18 DIAGNOSIS — Z12.11 COLON CANCER SCREENING: ICD-10-CM

## 2024-11-18 DIAGNOSIS — Z95.0 PACEMAKER: ICD-10-CM

## 2024-11-18 DIAGNOSIS — I67.1 CEREBRAL ANEURYSM (HCC): ICD-10-CM

## 2024-11-18 DIAGNOSIS — I87.2 VENOUS INSUFFICIENCY: ICD-10-CM

## 2024-11-18 DIAGNOSIS — N40.1 BENIGN PROSTATIC HYPERPLASIA WITH URINARY FREQUENCY: ICD-10-CM

## 2024-11-18 DIAGNOSIS — E78.00 HYPERCHOLESTEROLEMIA: ICD-10-CM

## 2024-11-18 DIAGNOSIS — R73.01 ABNORMAL FASTING GLUCOSE: ICD-10-CM

## 2024-11-18 DIAGNOSIS — M17.12 PRIMARY OSTEOARTHRITIS OF LEFT KNEE: Primary | ICD-10-CM

## 2024-11-18 DIAGNOSIS — I10 ESSENTIAL HYPERTENSION: ICD-10-CM

## 2024-11-18 DIAGNOSIS — M15.0 PRIMARY OSTEOARTHRITIS INVOLVING MULTIPLE JOINTS: ICD-10-CM

## 2024-11-18 DIAGNOSIS — Z96.651 HISTORY OF TOTAL KNEE REPLACEMENT, RIGHT: ICD-10-CM

## 2024-11-18 PROCEDURE — 1159F MED LIST DOCD IN RCRD: CPT | Performed by: INTERNAL MEDICINE

## 2024-11-18 PROCEDURE — 3078F DIAST BP <80 MM HG: CPT | Performed by: INTERNAL MEDICINE

## 2024-11-18 PROCEDURE — 1126F AMNT PAIN NOTED NONE PRSNT: CPT | Performed by: INTERNAL MEDICINE

## 2024-11-18 PROCEDURE — G2211 COMPLEX E/M VISIT ADD ON: HCPCS | Performed by: INTERNAL MEDICINE

## 2024-11-18 PROCEDURE — 3077F SYST BP >= 140 MM HG: CPT | Performed by: INTERNAL MEDICINE

## 2024-11-18 PROCEDURE — 99214 OFFICE O/P EST MOD 30 MIN: CPT | Performed by: INTERNAL MEDICINE

## 2024-11-18 PROCEDURE — 3008F BODY MASS INDEX DOCD: CPT | Performed by: INTERNAL MEDICINE

## 2024-11-18 PROCEDURE — 1160F RVW MEDS BY RX/DR IN RCRD: CPT | Performed by: INTERNAL MEDICINE

## 2024-11-18 RX ORDER — FUROSEMIDE 20 MG/1
20 TABLET ORAL DAILY
Qty: 90 TABLET | Refills: 3 | Status: SHIPPED | OUTPATIENT
Start: 2024-11-18

## 2024-11-18 RX ORDER — MELOXICAM 15 MG/1
15 TABLET ORAL DAILY
Qty: 90 TABLET | Refills: 3 | Status: SHIPPED | OUTPATIENT
Start: 2024-11-18

## 2024-11-18 RX ORDER — LISINOPRIL 20 MG/1
20 TABLET ORAL DAILY
Qty: 90 TABLET | Refills: 3 | Status: SHIPPED | OUTPATIENT
Start: 2024-11-18

## 2024-11-18 RX ORDER — ATORVASTATIN CALCIUM 20 MG/1
20 TABLET, FILM COATED ORAL NIGHTLY
Qty: 90 TABLET | Refills: 3 | Status: SHIPPED | OUTPATIENT
Start: 2024-11-18

## 2024-11-18 NOTE — PROGRESS NOTES
Romulo Kathleen is a 74 year old male.    HPI:     Chief Complaint   Patient presents with    Follow - Up     6 month f/u and establishing care with new pcp        73 y/o HTN, hypercholesterolemia, osteoarthritis left knee here for F/U;   Has seen orthopedics Dr Kirk Re: left knee pain; s/p cortisone injection; takes     meloxicam 15 mg po every day, and methocarbamol 750 mg po QID prn arthralgias; BP stable on lisinopril 20 mg po every day;  no CP; no SOB; no headaches; no palpitation          HISTORY:  Past Medical History:    Aneurysm of unspecified site (HCC)    Cataracts, both eyes    Hearing impairment    tinnitus    Hip pain    HYPERLIPIDEMIA    HYPERTENSION    HYPERTENSION NOS    Mixed hyperlipidemia    OBESITY    OBESITY NOS    Osteoarthritis    Other abnormal glucose    Other and unspecified hyperlipidemia    Special screening for malignant neoplasms, colon      Past Surgical History:   Procedure Laterality Date    Appendectomy      Cataract  may,2019    Knee arthroscopy Left     Knee replacement surgery      Nasal surg proc unlisted      Other surgical history  09/30/2007    coiling brain aneurysm/[place] [id number], repeat 8/09 two coils      Family History   Problem Relation Age of Onset    Lipids Father     Hypertension Father     Diabetes Father     Heart Disorder Mother         Pacemaker    Other (dementia) Mother     Diabetes Son     Other (Other) Brother         nephritis    Other (Other) Brother         BILAT TKR    Heart Disorder Brother       Social History:   Social History     Socioeconomic History    Marital status:    Tobacco Use    Smoking status: Never     Passive exposure: Past    Smokeless tobacco: Former   Vaping Use    Vaping status: Never Used   Substance and Sexual Activity    Alcohol use: Yes     Comment: 4 beers    Drug use: No   Social History Narrative    : 1977    Children: 2 sons    Exercise: ref for football    Employment: semi retired, 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 90 tablet 3    furosemide 20 MG Oral Tab Take 1 tablet (20 mg total) by mouth daily. 90 tablet 3    lisinopril 20 MG Oral Tab Take 1 tablet (20 mg total) by mouth daily. 90 tablet 3    Meloxicam 15 MG Oral Tab Take 1 tablet (15 mg total) by mouth daily. 90 tablet 3    methocarbamol 750 MG Oral Tab Take 1 tablet (750 mg total) by mouth 4 (four) times daily as needed. 40 tablet 3    aspirin 81 MG Oral Tab EC 1 TABLET DAILY         Allergies:  Allergies[1]              ROS:   Constitutional: no weight loss; no fatigue  Cardiovascular:  Negative for chest pain; negative palpitations  Respiratory:  Negative for cough, dyspnea and wheezing  Gastrointestinal:  Negative for abdominal pain, constipation, decreased appetite, diarrhea and vomiting; no melena or hematochezia  All other review of systems are negative.        PHYSICAL EXAM:   Blood pressure 144/72, pulse 81, temperature 97.9 °F (36.6 °C), height 6' (1.829 m), weight 258 lb (117 kg), SpO2 96%.  Constitutional: alert and oriented x3 in no acute distress  HEENT- EOMI, PERRL  Nose/Mouth/Throat: pharynx without erythema; no oral lesions  Neck/Thyroid: neck supple; no thyromegaly  Cardiovascular: RRR, S1, S2, no S3 or murmur  Respiratory: lungs without crackles or wheezes  Abdomen: normoactive bowel sounds, soft, non-tender and non-distended  Extremities: no clubbing, cyanosis or edema           ASSESSMENT/PLAN:   Osteoarthritis left knee  Has seen orthopedics   [facility]; s/p cortisone injection    Essential hypertension  On lisinopril 20 mg po qD     Pacemaker  Stable.  CPM.  Patient follows up with his cardiologist, Dr. Mauro Bauer.     Hypercholesterolemia  On atorvastatin 20 mg po qHS     Primary osteoarthritis involving multiple joints  In hands; on meloxicam 15 mg po every day, and methocarbamol 750 mg po QID prn     Venous insufficiency  On Lasix 20 mg po qD     History of total knee replacement, right  Doing well.  CPM.    Cerebral aneurysm (HCC)  In 2000s; s/p coil insertion; by IR Dr Mueller     Benign prostatic hyperplasia with urinary frequency  Doing well.  CPM.    Abnormal fasting glucose  Doing well.  CPM.  Patient's recent FBS was 96.  His hemoglobin A1c was 5.9.  CPM.     PSA screening  PSA 3.99 in April 2024    Colon cancer screening  Had colonoscopy at age 46; stool neg hemoccult in May 2024      Spent 30 minutes obtaining history, evaluating patient, discussing treatment options, diet, exercise, review of available labs and radiology reports, and completing documentation.              Orders This Visit:  Orders Placed This Encounter   Procedures    CBC With Differential With Platelet    Comp Metabolic Panel (14)    Lipid Panel    TSH W Reflex To Free T4    PSA Total, Screen    Urinalysis, Routine    Hemoglobin A1C       Meds This Visit:  Requested Prescriptions     Signed Prescriptions Disp Refills    atorvastatin 20 MG Oral Tab 90 tablet 3     Sig: Take 1 tablet (20 mg total) by mouth nightly.    furosemide 20 MG Oral Tab 90 tablet 3     Sig: Take 1 tablet (20 mg total) by mouth daily.    lisinopril 20 MG Oral Tab 90 tablet 3     Sig: Take 1 tablet (20 mg total) by mouth daily.    Meloxicam 15 MG Oral Tab 90 tablet 3     Sig: Take 1 tablet (15 mg total) by mouth daily.       Imaging & Referrals:  None     11/18/2024  Ze Young MD                 [1] No Known Allergies

## 2025-05-22 ENCOUNTER — HOSPITAL ENCOUNTER (OUTPATIENT)
Age: 75
Discharge: HOME | End: 2025-05-22
Payer: MEDICARE

## 2025-05-22 VITALS
WEIGHT: 255 LBS | OXYGEN SATURATION: 98 % | HEIGHT: 73 IN | HEART RATE: 63 BPM | DIASTOLIC BLOOD PRESSURE: 80 MMHG | SYSTOLIC BLOOD PRESSURE: 180 MMHG | TEMPERATURE: 99 F | BODY MASS INDEX: 33.8 KG/M2 | RESPIRATION RATE: 16 BRPM

## 2025-05-22 DIAGNOSIS — R03.0 ELEVATED BLOOD PRESSURE READING: ICD-10-CM

## 2025-05-22 DIAGNOSIS — R21 RASH AND NONSPECIFIC SKIN ERUPTION: Primary | ICD-10-CM

## 2025-05-22 PROCEDURE — 99213 OFFICE O/P EST LOW 20 MIN: CPT

## 2025-05-22 RX ORDER — VALACYCLOVIR HYDROCHLORIDE 1 G/1
1000 TABLET, FILM COATED ORAL 3 TIMES DAILY
Qty: 21 TABLET | Refills: 0 | Status: SHIPPED | OUTPATIENT
Start: 2025-05-22 | End: 2025-05-29

## 2025-05-22 RX ORDER — TRIAMCINOLONE ACETONIDE 1 MG/G
1 CREAM TOPICAL 2 TIMES DAILY
Qty: 15 G | Refills: 0 | Status: SHIPPED | OUTPATIENT
Start: 2025-05-22 | End: 2025-05-27

## 2025-05-22 NOTE — DISCHARGE INSTRUCTIONS
Start the Valtrex as prescribed as the rash at your neck appears to be shingles the rash on the scalp does not fully correlate with shingles but it also could still be shingles or it could be an insect bite or something that came in contact with the skin and you may apply the triamcinolone cream to areas that are itchy.  Recommend over-the-counter 24-hour Claritin or Zyrtec to help with itchy skin if you all of a sudden have an opening or drainage from the skin this would be concerning as you would be considered contagious you would need to isolate from others and cover the skin.  If all of a sudden the rash is on your face around the eye or on the eye go to the nearest emergency department if all of a sudden there is pus or drainage from the skin high fever severe headache any nausea or vomiting go to the nearest emergency department otherwise you can follow-up with your primary care provider in 1 week.

## 2025-05-22 NOTE — ED INITIAL ASSESSMENT (HPI)
Pt presents to the IC with c/o itchy, red bumps that started at the base of his neck and is spread up into the left side of his scalp after working in his garden. No pain. Pt hasn't applied anything or taken any allergy medications.

## 2025-05-22 NOTE — ED PROVIDER NOTES
Patient Seen in: [facility]        History  Chief Complaint   Patient presents with    Rash     Stated Complaint: insect bite, spreading rash    Subjective:   HPI            This is a 74-year-old male with a history of hyperlipidemia hypertension obesity osteoarthritis presenting with a rash.  Patient states that he has a rash that started on the left side base of his neck that is now spread to the left side of his head.  Patient states he thinks it started after working in the garden thought maybe he got bit by an insect and feels some discomfort along the neck where the rash is but now since the rash is spread it is a little bit itchy he has not done any over-the-counter medications topical or pill form.  Patient states he works with some doctors who thought maybe it might be shingles or some other kind of rash who recommended he come in to be evaluated.  No fever no headache no nausea no vomiting no pus or drainage from the skin.      Objective:     Past Medical History:    Aneurysm of unspecified site    Cataracts, both eyes    Hearing impairment    tinnitus    Hip pain    HYPERLIPIDEMIA    HYPERTENSION    HYPERTENSION NOS    Mixed hyperlipidemia    OBESITY    OBESITY NOS    Osteoarthritis    Other abnormal glucose    Other and unspecified hyperlipidemia    Special screening for malignant neoplasms, colon              Past Surgical History:   Procedure Laterality Date    Appendectomy      Cataract  may,2019    Knee arthroscopy Left     Knee replacement surgery      Nasal surg proc unlisted      Other surgical history  09/30/2007    coiling brain aneurysm/[place] [id number], repeat 8/09 two coils                Social History     Socioeconomic History    Marital status:    Tobacco Use    Smoking status: Never     Passive exposure: Past    Smokeless tobacco: Former   Vaping Use    Vaping status: Never Used   Substance and Sexual Activity    Alcohol use: Yes     Comment: 4 beers    Drug use:  No   Social History Narrative    : 1977    Children: 2 sons    Exercise: ref for football    Employment: semi retired, 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  Constitutional and vital signs reviewed.      All other systems reviewed and negative except as noted above.                  Physical Exam    ED Triage Vitals [05/22/25 1524]   BP (!) 162/82   Pulse 63   Resp 16   Temp 98.5 °F (36.9 °C)   Temp src Oral   SpO2 98 %   O2 Device None (Room air)       Current Vitals:   Vital Signs  BP: (!) 180/80  Pulse: 63  Resp: 16  Temp: 98.5 °F (36.9 °C)  Temp src: Oral    Oxygen Therapy  SpO2: 98 %  O2 Device: None (Room air)            Physical Exam  Vitals and nursing note reviewed.   Constitutional:       Appearance: Normal appearance.   HENT:      Right Ear: External ear normal.      Left Ear: External ear normal.      Nose: Nose normal.      Mouth/Throat:      Mouth: Mucous membranes are moist.      Pharynx: Oropharynx is clear.   Eyes:      Conjunctiva/sclera: Conjunctivae normal.   Musculoskeletal:         General: Normal range of motion.      Cervical back: Normal range of motion.   Skin:     General: Skin is warm and dry.      Capillary Refill: Capillary refill takes less than 2 seconds.      Findings: Rash present. Rash is macular, papular and vesicular.          Neurological:      General: No focal deficit present.      Mental Status: He is alert and oriented to person, place, and time.   Psychiatric:         Mood and Affect: Mood normal.                 ED Course  Labs Reviewed - No data to display                     MDM          Medical Decision Making  74-year-old male well-appearing nontoxic blood pressure elevated asymptomatic with elevated blood pressure to presenting for evaluation of rash.  DDx shingles versus insect  bite versus contact dermatitis versus allergic reaction.  The rash at the base of his neck appears to be shingles but the rash on the scalp has the appearance of a contact dermatitis.  Discussed treatment for possible contact dermatitis and shingles with Valtrex and triamcinolone cream discussed over-the-counter antihistamine such as Zyrtec or Claritin for itchy skin discussed outpatient follow-up for reevaluation with PCP for elevated blood pressure reading and for the rash.  Discussed ER precautions with any new or worsening symptoms.  Patient is agreeable with the plan of care and acknowledges understanding discharge instructions.    Problems Addressed:  Elevated blood pressure reading: acute illness or injury  Rash and nonspecific skin eruption: acute illness or injury    Risk  OTC drugs.  Prescription drug management.        Disposition and Plan     Clinical Impression:  1. Rash and nonspecific skin eruption    2. Elevated blood pressure reading         Disposition:  Discharge  5/22/2025  3:33 pm    Follow-up:  Ze Young MD  [address] [place] [id number]  [phone]    In 1 week  Follow-up with your primary care provider for elevated blood pressure reading and for a skin check          Medications Prescribed:  Discharge Medication List as of 5/22/2025  3:35 PM        START taking these medications    Details   valACYclovir 1 G Oral Tab Take 1 tablet (1,000 mg total) by mouth 3 (three) times daily for 7 days., Normal, Disp-21 tablet, R-0      triamcinolone 0.1 % External Cream Apply 1 Application topically 2 (two) times daily for 5 days., Normal, Disp-15 g, R-0                   Supplementary Documentation:

## 2025-05-27 ENCOUNTER — TELEPHONE (OUTPATIENT)
Dept: INTERNAL MEDICINE CLINIC | Facility: CLINIC | Age: 75
End: 2025-05-27

## 2025-05-27 DIAGNOSIS — R03.0 ELEVATED BLOOD PRESSURE READING: ICD-10-CM

## 2025-05-27 DIAGNOSIS — R21 RASH AND NONSPECIFIC SKIN ERUPTION: ICD-10-CM

## 2025-05-27 RX ORDER — TRIAMCINOLONE ACETONIDE 1 MG/G
1 CREAM TOPICAL 2 TIMES DAILY
Qty: 45 G | Refills: 0 | Status: SHIPPED | OUTPATIENT
Start: 2025-05-27 | End: 2025-06-01

## 2025-05-27 NOTE — TELEPHONE ENCOUNTER
Pt. Called stating he was seen in UC last Friday and was diagnosed with shingles.  They gave him some medications, one of them was Triamcinolone.  Pt. Is already out of the Triamcinolone vream and is asking is asking for a refills.  Please send to the [facility] in [place].

## 2025-05-27 NOTE — TELEPHONE ENCOUNTER
Please notify pt that refill was sent; rash should improve in the next 5-7 days; would recommend follow up visit in office if not improved

## 2025-05-27 NOTE — TELEPHONE ENCOUNTER
Clinical Impression:  1. Rash and nonspecific skin eruption    2. Elevated blood pressure reading         Details   valACYclovir 1 G Oral Tab Take 1 tablet (1,000 mg total) by mouth 3 (three) times daily for 7 days., Normal, Disp-21 tablet, R-0       triamcinolone 0.1 % External Cream Apply 1 Application topically 2 (two) times daily for 5 days., Normal, Disp-15 g, R-0           To  - patient wants refill for Triamcinolone cream   [facility] in [place]    To

## 2025-09-08 ENCOUNTER — APPOINTMENT (OUTPATIENT)
Age: 75
End: 2025-09-08

## (undated) DEVICE — ESMARK: Brand: DEROYAL

## (undated) DEVICE — COTTON UNDERCAST PADDING,REGULAR FINISH: Brand: WEBRIL

## (undated) DEVICE — 3M™ STERI-DRAPE™ INCISE DRAPE 1050 (60CM X 45CM): Brand: STERI-DRAPE™

## (undated) DEVICE — 3M™ STERI-STRIP™ REINFORCED ADHESIVE SKIN CLOSURES, R1547, 1/2 IN X 4 IN (12 MM X 100 MM), 6 STRIPS/ENVELOPE: Brand: 3M™ STERI-STRIP™

## (undated) DEVICE — SUTURE PDS II 1 CT-1

## (undated) DEVICE — STOCKINETTE

## (undated) DEVICE — TRAY SKIN PREP PVP-1

## (undated) DEVICE — Device: Brand: PROPHECY

## (undated) DEVICE — POLAR CARE CUBE COOLING UNIT

## (undated) DEVICE — BLADE SW .5IN MCHC 2.75IN

## (undated) DEVICE — Device

## (undated) DEVICE — BATTERY

## (undated) DEVICE — PROXIMATE SKIN STAPLERS (35 WIDE) CONTAINS 35 STAINLESS STEEL STAPLES (FIXED HEAD): Brand: PROXIMATE

## (undated) DEVICE — 3M™ STERI-DRAPE™ PATIENT ISOLATION DRAPE 1014: Brand: STERI-DRAPE™

## (undated) DEVICE — GAMMEX® PI HYBRID SIZE 9, STERILE POWDER-FREE SURGICAL GLOVE, POLYISOPRENE AND NEOPRENE BLEND: Brand: GAMMEX

## (undated) DEVICE — 48MM PIN

## (undated) DEVICE — 450 ML BOTTLE OF 0.05% CHLORHEXIDINE GLUCONATE IN 99.95% STERILE WATER FOR IRRIGATION, USP AND APPLICATOR.: Brand: IRRISEPT ANTIMICROBIAL WOUND LAVAGE

## (undated) DEVICE — 3M™ STERI-STRIP™ COMPOUND BENZOIN TINCTURE 40 BAGS/CARTON 4 CARTONS/CASE C1544: Brand: 3M™ STERI-STRIP™

## (undated) DEVICE — KIT DRN 3/16IN PVC DRN 3 SPRG

## (undated) DEVICE — PAD THRP 16IN WRPON MU LNG STM

## (undated) DEVICE — SOLUTION SURG DURA PREP HAZMAT

## (undated) DEVICE — TRAY FOLEY BDX 16F STATLOCK

## (undated) DEVICE — ZIMMER® STERILE DISPOSABLE TOURNIQUET CUFF WITH PLC, DUAL PORT, SINGLE BLADDER, 34 IN. (86 CM)

## (undated) DEVICE — BLADE SAW SAGITTAL 19.5

## (undated) DEVICE — BANDAGE FLXMSTR 11YDX6IN STRL

## (undated) DEVICE — SOL  .9 1000ML BTL

## (undated) DEVICE — CEMENT MIXING SYSTEM WITH FEMORAL BREAKWAY NOZZLE: Brand: REVOLUTION

## (undated) DEVICE — 3M™ IOBAN™ 2 ANTIMICROBIAL INCISE DRAPE 6640EZ: Brand: IOBAN™ 2

## (undated) DEVICE — DRESSING BIOPATCH 1X4 CNTR

## (undated) DEVICE — SUTURE MONOCRYL 3-0 Y936H

## (undated) DEVICE — BANDAGE ROLL,100% COTTON, 6 PLY, LARGE: Brand: KERLIX

## (undated) DEVICE — PEN: MARKING STD PT 100/CS: Brand: MEDICAL ACTION INDUSTRIES

## (undated) DEVICE — SUTURE VICRYL 0 CP-1

## (undated) DEVICE — WEBRIL COTTON UNDERCAST PADDING: Brand: WEBRIL

## (undated) DEVICE — 3M™ TEGADERM™ TRANSPARENT FILM DRESSING, 1626W, 4 IN X 4-3/4 IN (10 CM X 12 CM), 50 EACH/CARTON, 4 CARTON/CASE: Brand: 3M™ TEGADERM™

## (undated) DEVICE — TOTAL KNEE: Brand: MEDLINE INDUSTRIES, INC.

## (undated) DEVICE — COTTON ROLL: Brand: DEROYAL

## (undated) DEVICE — T5 HOOD WITH PEEL AWAY FACE SHIELD

## (undated) NOTE — MR AVS SNAPSHOT
[address]  [address]  [phone]               Thank you for choosing us for your health care visit with Milo Lynn DPM.  We are glad to serve you and happy to provide yo Take 1 tablet (15 mg total) by mouth daily.    Commonly known as:  ARA           SB LOW DOSE ASA EC 81 MG Tbec   Generic drug:  aspirin   1 TABLET DAILY                   MyChart     Call the [facility] for assistance with your inactive [facility] account    I

## (undated) NOTE — Clinical Note
January 13, 2017         Adair Johnston MD   [address]      Patient: Maryanne Lau   YOB: 1950   Date of Visit: 1/13/2017       Dear Dr. Nakul Miller MD,    I saw your patient, Maryanne Lau, on 1/13

## (undated) NOTE — MR AVS SNAPSHOT
[address]  [address]  [phone]               Thank you for choosing us for your health care visit with Lisbeth Veloz DPM.  We are glad to serve you and happy to provide yo Imaging:  XR FOOT, COMPLETE (MIN 3 VIEWS), LEFT (CPT=73630)    Instructions:   To schedule a test at any [facility], call Central Scheduling at [phone], Monday through Friday between 7:30am to 6pm and on Saturday between

## (undated) NOTE — MR AVS SNAPSHOT
[address]  [address]  [phone]               Thank you for choosing us for your health care visit with Anya Cárdenas DPM.  We are glad to serve you and happy to provide yo [address] [place], [address], [address] [facility]  [address]  [address] [facility], [address]  [address] [address], [address]

## (undated) NOTE — LETTER
8/4/2017          To Whom It May Concern:    Christian Lowery is currently under my medical care and was seen in my office today. He may return to work on Monday, August 14, 2017, with no restrictions. Thank you.         Sincerely,    Gigi Velasco

## (undated) NOTE — MR AVS SNAPSHOT
[facility] [place]  [address] [place] [address]  [phone]               Thank you for choosing us for your health care visit with Woody Diaz MD.  We are glad to serve you and happy to provide you with this summary of your visit.   Pl 1.  Patient is to continue his current diet, medications and activity. 2.  Patient was given a Tdap vaccine today. 3.  I will plan to see the patient back in 4 months with blood tests, urinalysis and EKG.   His blood tests will include a CBC, CMP, lipid p

## (undated) NOTE — MR AVS SNAPSHOT
[facility] [place]  [address] [place] [address]  [phone]               Thank you for choosing us for your health care visit with Ericka Scales MD.  We are glad to serve you and happy to provide you with this summary of your visit.   Pl Take 1 tablet (10 mg total) by mouth daily. Commonly known as:  PRINIVIL,ZESTRIL           Meloxicam 15 MG Tabs   Take 1 tablet (15 mg total) by mouth daily.    Commonly known as:  ARA           SB LOW DOSE ASA EC 81 MG Tbec   Generic drug:  aspirin   1 ? Make sure carpeting is secure. FLOORS:  ? Remove all loose wires, cords and throw rugs. ? Keep floors clear of clutter. ? Make sure carpets and area rugs have skid proof backing. ? Do not use slippery wax on bare floors. ?  Keep furniture in its accu

## (undated) NOTE — LETTER
AUTHORIZATION FOR SURGICAL OPERATION OR OTHER PROCEDURE    1. I hereby authorize Dr. Kirk/ Chary Webster PA-C, and [facility] staff assigned to my case to perform the following operation and/or procedure at the [facility] site:    _______________________________________________________________________________________________    Cortisone Injection to Left Knee  _______________________________________________________________________________________________    2.  My physician has explained the nature and purpose of the operation or other procedure, possible alternative methods of treatment, the risks involved, and the possibility of complication to me.  I acknowledge that no guarantee has been made as to the result that may be obtained.  3.  I recognize that, during the course of this operation, or other procedure, unforseen conditions may necessitate additional or different procedure than those listed above.  I, therefore, further authorize and request that the above named physician, his/her physician assistants or designees perform such procedures as are, in his/her professional opinion, necessary and desirable.  4.  Any tissue or organs removed in the operation or other procedure may be disposed of by and at the discretion of the [facility] and [facility].  5.  I understand that in the event of a medical emergency, I will be transported by local paramedics to [facility] or other hospital emergency department.  6.  I certify that I have read and fully understand the above consent to operation and/or other procedure.    7.  I acknowledge that my physician has explained sedation/analgesia administration to me including the risks and benefits.  I consent to the administration of sedation/analgesia as may be necessary or desirable in the judgement of my physician.    Witness signature: ___________________________________________________ Date:   ______/______/_____                    Time:  ________ A.M.  P.M.       Patient Name:  ______________________________________________________  (please print)      Patient signature:  ___________________________________________________             Relationship to Patient:           []  Parent    Responsible person                          []  Spouse  In case of minor or                    [] Other  _____________   Incompetent name:  __________________________________________________                               (please print)      _____________      Responsible person  In case of minor or  Incompetent signature:  _______________________________________________    Statement of Physician  My signature below affirms that prior to the time of the procedure, I have explained to the patient and/or his/her guardian, the risks and benefits involved in the proposed treatment and any reasonable alternative to the proposed treatment.  I have also explained the risks and benefits involved in the refusal of the proposed treatment and have answered the patient's questions.                        Date:  ______/______/_______  Provider                      Signature:  __________________________________________________________       Time:  ___________ A.M    P.M.

## (undated) NOTE — IP AVS SNAPSHOT
Patient Demographics     Address  [address] [address] Phone  [phone] [facility])  [phone] [facility] E-mail Address  [email or web address]      Emergency Contact(s)     Name Relation Home Work [phone] traMADol HCl 50 MG Tabs  Commonly known as:  ULTRAM  Next dose due:  As needed for pain      Take 1 tablet (50 mg total) by mouth every 6 (six) hours as needed for Pain.    Raghu Paige MD               Where to Get Your Medications      Please  y [id number] docusate sodium (COLACE) cap 100 mg 05/04/19 0953 Given      [id number] famoTIDine (PEPCID) tab 20 mg (Or Linked Group #1) 05/03/19 2037 Given      [id number] famoTIDine (PEPCID) tab 20 mg 05/04/19 0953 Given      [id number] lisinopril (Wylie Presume Order Status:  Sent Lab Status: In process Updated:  05/03/19 9778    Specimen:  Urine, quintero catheter       H&P Note    No notes of this type exist for this encounter.         Consults - MD Consult Notes      Consults signed by Tre Logan MD at 5 methocarbamol 500 MG Oral Tab Take 1 tablet by mouth four times daily as needed for back pain Disp: 30 tablet Rfl: 3   AmLODIPine Besylate (NORVASC) 5 MG Oral Tab Take 1 tablet (5 mg total) by mouth daily.  Disp: 90 tablet Rfl: 3   MELOXICAM 15 MG Oral Tab · The patient is alert, oriented and appropriate throughout the examination, in no apparent distress, resting comfortably on the examination table.      · Pulse and respiratory rate appear normal and regular.      · Lack of physiologic genu valgum posturin XR KNEE ROUTINE (3 VIEWS), RIGHT (XOZ=85630)  MRI KNEE, RIGHT (CPT=73721)  XR CHEST PA + LAT CHEST (CPT=71046)  EKG 12-LEAD     Romulo Kathleen's symptoms have been progressive and unresponsive to conservative treatment including limitation of activity, extramedullary/intramedullary instrumentation with or without PSI instrumentation; periprosthetic fractures (increased incidence in patients with osteopenia) requiring additional medical and/or surgical treatment; extensor mechanism disruption (increased i deemed medically stable, we will consider proceeding accordingly.  We discussed the benefits of utilization of patient specific instrumentation and explained its utilization.      Meanwhile home exercises, anti-inflammatory medication and activities as tole Tricompartment osteoarthritis of left knee     Essential hypertension     Hypercholesteremia     Cerebral aneurysm     Primary osteoarthritis of left knee     Primary osteoarthritis involving multiple joints     Abnormal EKG        Physical Exam:     Ge Take 1 tablet (20 mg total) by mouth daily. Quantity:  90 tablet  Refills:  3     traMADol HCl 50 MG Tabs  Commonly known as:  ULTRAM      Take 1 tablet (50 mg total) by mouth every 6 (six) hours as needed for Pain.    Quantity:  5 tablet  Refills:  0 with motions. Patient inc amb distance and improved balance in pm. Patient instructed on knee protocol exer in both sessions with cues for correct from. Patient left in chair am and pm with all needs in reach, RN aware.        The patient's Approx Degree of Standardized Score (AM-PAC Scale): 42.13   CMS Modifier (G-Code): CK    FUNCTIONAL ABILITY STATUS  Gait Assessment   Gait Assistance: Minimum assistance  Distance (ft): 200  Assistive Device: Rolling walker  Pattern: R Decreased stance time  Stoop/Curb Ass DK.1 Rehana Reynoso, PTA on 5/3/2019  3:20 PM               Physical Therapy Note signed by Carmelita Daniels PT, DPT [facility] at 5/2/2019  3:14 PM  Version 1 of 1    Author:  Carmelita Daniels PT, DPT [facility] Service:  Rehab Author Type:  Physical Therapist    Jennifer Caputo gait, transfers, ROM, strength, stair mobility, bed mobility,  which are below the patient's pre-admission status. Pt education performed for bed mobility and transfers with a RW with CGA.  Pt reported he has been throwing up on and off all night and was a • Other abnormal glucose    • Other and unspecified hyperlipidemia    • Special screening for malignant neoplasms, colon 8/3/2007       Past Surgical History  Past Surgical History:   Procedure Laterality Date   • APPENDECTOMY     • KNEE ARTHROSCOPY Left -   Sitting down on and standing up from a chair with arms (e.g., wheelchair, bedside commode, etc.): A Little   -   Moving from lying on back to sitting on the side of the bed?: A Little   How much help from another person does the patient currently need. Goal #3   Current Status CGA with amb 50' x 2 with RW   Goal #4 Patient will negotiate 10 stairs/one curb w/ assistive device and supervision   Goal #4   Current Status NT   Goal #5 Patient to demonstrate independence with home activity/exercise instructio 42. 8%[BA. 2] has been calculated based on documentation in the [facility] '6 clicks' Inpatient Daily Activity Short Form.   Research supports that patients with this level of impairment may benefit from home with HHOT, to max I with ADLS and to max safety with tr Standardized Score (AM-PAC Scale): 40.22  CMS Modifier (G-Code): CK[BA. 2]    FUNCTIONAL TRANSFER ASSESSMENT[BA.1]  Supine to Sit : Minimum assistance(with support of R Leg)  Sit to Stand: Minimum assistance[BA. 2]  Toilet Transfer: min assist with grab bar Filed:  5/2/2019 11:49 AM Date of Service:  5/2/2019 10:45 AM Status:  Signed    :  Carletha Lesch, OT (Occupational Therapist)       OCCUPATIONAL THERAPY EVALUATION - INPATIENT      Room Number: 408/408-A  Evaluation Date: 5/2/2019  Type of Evalua OCCUPATIONAL THERAPY MEDICAL/SOCIAL HISTORY     Problem List   Active Problems:    Primary osteoarthritis of right knee    Essential hypertension    Hypercholesteremia      Past Medical History  Past Medical History:   Diagnosis Date   • Aneurysm of unspec Overall Cognitive Status:  WFL - within functional limits    RANGE OF MOTION   Upper extremity ROM is within functional limits     STRENGTH ASSESSMENT  Upper extremity strength is within functional limits       ACTIVITIES OF DAILY LIVING ASSESSMENT  AM-PAC Goals  on: 19  Frequency: 5 x/week    Jony Quevedo MA, OTR/L  Occupational Therapist[SS.1]       Attribution Moe    SS. 1 - Kan Denise OT on 2019 11:42 AM                     Video Swallow Study Notes    No notes of this type exist fo

## (undated) NOTE — LETTER
Hospital Discharge Documentation  Pt was discharged to rehab at [facility].      From: [address] Hospitalist's Office  Phone: [phone]    Patient discharged time/date: 5/4/2019  1:37 PM  Patient discharge disposition:  SNF       Discharge Summa Abd: Abdomen soft, nontender, nondistended, bowel sounds present  Neuro: No acute focal deficits  MSK: Full range of motion in extremities  Skin: Warm and dry  Psych: Normal affect  Ext: no c/c/e      History of Present Illness: Patient with right knee oa Where to Get Your Medications      Please  your prescriptions at the location directed by your doctor or nurse    Bring a paper prescription for each of these medications  · apixaban 2.5 MG Tabs  · HYDROcodone-acetaminophen 7.5-325 MG Tabs  · traMAD

## (undated) NOTE — MR AVS SNAPSHOT
[address] [facility] [address]  [phone]               Thank you for choosing us for your health care visit with So Apple DPM.  We are glad to serve you and happy to provide yo [employer] [facility] [address] [place], [place] [address] for making healthy food choices  -   Enjoy your food, but eat less. Fully enjoy your food when eating. Don’t eat while distracted and slow down. Avoid over sized portions. Don’t eat while when you’re bored.